# Patient Record
Sex: MALE | Race: WHITE | NOT HISPANIC OR LATINO | Employment: OTHER | URBAN - METROPOLITAN AREA
[De-identification: names, ages, dates, MRNs, and addresses within clinical notes are randomized per-mention and may not be internally consistent; named-entity substitution may affect disease eponyms.]

---

## 2017-07-10 ENCOUNTER — GENERIC CONVERSION - ENCOUNTER (OUTPATIENT)
Dept: OTHER | Facility: OTHER | Age: 77
End: 2017-07-10

## 2018-01-12 NOTE — MISCELLANEOUS
Message  TOVIAZ TOO EXPENSIVE  PER DR JENSEN PT TO TRY OXYBUTYNIN 5MG TID  TO CALL OFFICE WITH STATUS  FLOMAX WAS D/C'D      Active Problems    1  Aortic stenosis (424 1) (I35 0)   2  Benign Prostatic Hypertrophy Without Urinary Obstruction With Other Lower Urinary Tract   Symptoms (600 00)   3  Functional murmur (R01 0)   4  Hypercholesterolemia (272 0) (E78 00)   5  Pre-operative cardiovascular examination (V72 81) (Z01 810)   6  Right bundle branch block (RBBB) (426 4) (I45 10)   7  Right inguinal hernia (550 90) (K40 90)    Current Meds   1  Flomax 0 4 MG Oral Capsule; Therapy: (Recorded:[id number]) to Recorded   2  Lovastatin 20 MG Oral Tablet; Therapy: (Recorded:17Jan2014) to Recorded   3  Oxybutynin Chloride 5 MG Oral Tablet; Therapy: (Recorded:[id number]) to Recorded    Allergies    1   No Known Drug Allergies    Plan  Benign Prostatic Hypertrophy Without Urinary Obstruction With Other Lower Urinary Tract  Symptoms    · Flomax 0 4 MG Oral Capsule (Tamsulosin HCl)    Signatures   Electronically signed by : Melissa Krishnamurthy RN; Jul 10 2017  4:58PM EST                       (Author)

## 2018-02-21 ENCOUNTER — OFFICE VISIT (OUTPATIENT)
Dept: CARDIOLOGY CLINIC | Facility: CLINIC | Age: 78
End: 2018-02-21
Payer: MEDICARE

## 2018-02-21 VITALS
HEIGHT: 71 IN | DIASTOLIC BLOOD PRESSURE: 70 MMHG | RESPIRATION RATE: 16 BRPM | WEIGHT: 197.8 LBS | BODY MASS INDEX: 27.69 KG/M2 | SYSTOLIC BLOOD PRESSURE: 118 MMHG | HEART RATE: 67 BPM

## 2018-02-21 DIAGNOSIS — I45.10 RBBB (RIGHT BUNDLE BRANCH BLOCK): ICD-10-CM

## 2018-02-21 DIAGNOSIS — E78.00 HYPERCHOLESTEREMIA: ICD-10-CM

## 2018-02-21 DIAGNOSIS — I35.0 AORTIC VALVE STENOSIS, ETIOLOGY OF CARDIAC VALVE DISEASE UNSPECIFIED: Primary | ICD-10-CM

## 2018-02-21 DIAGNOSIS — I35.0 NONRHEUMATIC AORTIC VALVE STENOSIS: ICD-10-CM

## 2018-02-21 PROCEDURE — 93000 ELECTROCARDIOGRAM COMPLETE: CPT | Performed by: INTERNAL MEDICINE

## 2018-02-21 PROCEDURE — 99213 OFFICE O/P EST LOW 20 MIN: CPT | Performed by: INTERNAL MEDICINE

## 2018-02-21 RX ORDER — OXYBUTYNIN CHLORIDE 5 MG/1
TABLET ORAL
COMMUNITY
Start: 2017-11-27 | End: 2018-09-26 | Stop reason: ALTCHOICE

## 2018-02-21 RX ORDER — MULTIVITAMIN
1 CAPSULE ORAL DAILY
COMMUNITY

## 2018-02-21 RX ORDER — LOVASTATIN 20 MG/1
20 TABLET ORAL
COMMUNITY
Start: 2018-01-02

## 2018-02-21 RX ORDER — TAMSULOSIN HYDROCHLORIDE 0.4 MG/1
CAPSULE ORAL
COMMUNITY
Start: 2018-01-02 | End: 2018-06-25 | Stop reason: SDUPTHER

## 2018-02-21 NOTE — PROGRESS NOTES
Cardiology Follow Up        Amber Darren      1940      [id number]      Discussion/Summary:    1  Aortic valve stenosis:  Mild by echocardiogram in 2016, sounds moderate by auscultation on today's exam   Will recheck echocardiogram   Patient requested to call 1 day after completion of echocardiogram to discuss results over the phone  If mild-to-moderate, will repeat in 2 years  If moderate, will repeat in 1 year  2   Dyslipidemia:  Continue lovastatin, routine lipid panel per PCP        Interval History: This is a very pleasant 77-year-old male with a history of mild aortic valve stenosis, diagnosed in 2016, at which time he was seen by Dr Shira Aranda of our group  He also has a complete RBBB  He did not follow up after that up until today  He denies any symptoms at rest and with exertion  He denies chest pain, shortness of breath, lightheadedness, dizziness, presyncope, syncope, orthopnea, palpitations, lower extremity edema  He has been told of a heart murmur since childhood  Vitals:  Vitals:    02/21/18 1019   BP: 118/70   BP Location: Left arm   Patient Position: Sitting   Cuff Size: Standard   Pulse: 67   Resp: 16   Weight: 89 7 kg (197 lb 12 8 oz)   Height: 5' 11" (1 803 m)         No past medical history on file  Social History     Social History    Marital status:      Spouse name: N/A    Number of children: N/A    Years of education: N/A     Occupational History    Not on file  Social History Main Topics    Smoking status: Former Smoker    Smokeless tobacco: Never Used    Alcohol use Not on file    Drug use: Unknown    Sexual activity: Not on file     Other Topics Concern    Not on file     Social History Narrative    No narrative on file      No family history on file  No past surgical history on file      Current Outpatient Prescriptions:     lovastatin (MEVACOR) 20 mg tablet, , Disp: , Rfl:     Multiple Vitamin (MULTIVITAMIN) capsule, Take 1 capsule by mouth daily, Disp: , Rfl:     tamsulosin (FLOMAX) 0 4 mg, , Disp: , Rfl:     oxybutynin (DITROPAN) 5 mg tablet, , Disp: , Rfl:     Labs:  No visits with results within 2 Month(s) from this visit  Latest known visit with results is:   No results found for any previous visit  Review of Systems:  Review of Systems   Respiratory: Negative  Cardiovascular: Negative  All other systems reviewed and are negative  Physical Exam:  Physical Exam   Constitutional: He is oriented to person, place, and time  He appears well-developed and well-nourished  No distress  HENT:   Head: Normocephalic and atraumatic  Eyes: EOM are normal  Pupils are equal, round, and reactive to light  Right eye exhibits no discharge  No scleral icterus  Neck: Normal range of motion  Neck supple  No thyromegaly present  Cardiovascular: Normal rate, regular rhythm and normal heart sounds  Exam reveals no gallop and no friction rub  No murmur heard  Pulmonary/Chest: Effort normal and breath sounds normal    Abdominal: He exhibits no distension  There is no tenderness  There is no rebound and no guarding  Musculoskeletal: Normal range of motion  He exhibits no edema  Neurological: He is alert and oriented to person, place, and time  Coordination normal    Skin: Skin is warm and dry  No rash noted  He is not diaphoretic  No erythema  No pallor  Psychiatric: He has a normal mood and affect   His behavior is normal  Judgment and thought content normal

## 2018-03-06 ENCOUNTER — HOSPITAL ENCOUNTER (OUTPATIENT)
Dept: NON INVASIVE DIAGNOSTICS | Facility: CLINIC | Age: 78
Discharge: HOME | End: 2018-03-06
Payer: MEDICARE

## 2018-03-06 DIAGNOSIS — I35.0 AORTIC VALVE STENOSIS, ETIOLOGY OF CARDIAC VALVE DISEASE UNSPECIFIED: ICD-10-CM

## 2018-03-06 PROCEDURE — 93306 TTE W/DOPPLER COMPLETE: CPT | Performed by: INTERNAL MEDICINE

## 2018-03-06 PROCEDURE — 93306 TTE W/DOPPLER COMPLETE: CPT

## 2018-04-26 LAB
ABSOL LYMPHOCYTES (HISTORICAL): 1.2 K/UL (ref 0.5–4)
ALBUMIN SERPL BCP-MCNC: 4 G/DL (ref 3–5.2)
ALP SERPL-CCNC: 63 U/L (ref 43–122)
ALT SERPL W P-5'-P-CCNC: 33 U/L (ref 9–52)
AMORPHOUS MATERIAL (HISTORICAL): ABNORMAL
ANION GAP SERPL CALCULATED.3IONS-SCNC: 9 MMOL/L (ref 5–14)
AST SERPL W P-5'-P-CCNC: 26 U/L (ref 17–59)
BACTERIA UR QL AUTO: ABNORMAL
BASOPHILS # BLD AUTO: 0.1 K/UL (ref 0–0.1)
BASOPHILS # BLD AUTO: 1 % (ref 0–1)
BILIRUB SERPL-MCNC: 0.6 MG/DL
BILIRUB UR QL STRIP: NEGATIVE MG/DL
BUN SERPL-MCNC: 16 MG/DL (ref 5–25)
CALCIUM SERPL-MCNC: 9.7 MG/DL (ref 8.4–10.2)
CASTS/CASTS TYPE (HISTORICAL): ABNORMAL /LPF
CHLORIDE SERPL-SCNC: 107 MEQ/L (ref 97–108)
CHOLEST SERPL-MCNC: 171 MG/DL
CHOLEST/HDLC SERPL: 2.6 {RATIO}
CLARITY UR: CLEAR
CO2 SERPL-SCNC: 29 MMOL/L (ref 22–30)
COLOR UR: YELLOW
CREATINE, SERUM (HISTORICAL): 1.06 MG/DL (ref 0.7–1.5)
CRYSTAL TYPE (HISTORICAL): ABNORMAL /HPF
DEPRECATED RDW RBC AUTO: 13.4 %
EGFR (HISTORICAL): >60 ML/MIN/1.73 M2
EOSINOPHIL # BLD AUTO: 0.2 K/UL (ref 0–0.4)
EOSINOPHIL NFR BLD AUTO: 3 % (ref 0–6)
GLUCOSE FASTING (HISTORICAL): 103 MG/DL (ref 70–99)
GLUCOSE UR STRIP-MCNC: NEGATIVE MG/DL
HCT VFR BLD AUTO: 44 % (ref 41–53)
HDLC SERPL-MCNC: 67 MG/DL
HGB BLD-MCNC: 14.7 G/DL (ref 13.5–17.5)
HGB UR QL STRIP.AUTO: NEGATIVE
KETONES UR STRIP-MCNC: NEGATIVE MG/DL
LDL/HDL RATIO (HISTORICAL): 1.4
LDLC SERPL CALC-MCNC: 91 MG/DL
LEUKOCYTE ESTERASE UR QL STRIP: ABNORMAL
LYMPHOCYTES NFR BLD AUTO: 25 % (ref 25–45)
MCH RBC QN AUTO: 29.9 PG (ref 26–34)
MCHC RBC AUTO-ENTMCNC: 33.4 % (ref 31–36)
MCV RBC AUTO: 89 FL (ref 80–100)
MONOCYTES # BLD AUTO: 0.3 K/UL (ref 0.2–0.9)
MONOCYTES NFR BLD AUTO: 7 % (ref 1–10)
MUCOUS THREADS URNS QL MICRO: ABNORMAL
NEUTROPHILS ABS COUNT (HISTORICAL): 3.2 K/UL (ref 1.8–7.8)
NEUTS SEG NFR BLD AUTO: 64 % (ref 45–65)
NITRITE UR QL STRIP: NEGATIVE
NON-SQ EPI CELLS URNS QL MICRO: ABNORMAL
OTHER STN SPEC: ABNORMAL
PH UR STRIP.AUTO: 6.5 [PH] (ref 4.5–8)
PLATELET # BLD AUTO: 224 K/MCL (ref 150–450)
POTASSIUM SERPL-SCNC: 4.7 MEQ/L (ref 3.6–5)
PROT UR STRIP-MCNC: NEGATIVE MG/DL
PSA (HISTORICAL): 3.28 NG/ML
RBC # BLD AUTO: 4.93 M/MCL (ref 4.5–5.9)
RBC #/AREA URNS AUTO: ABNORMAL /HPF
SODIUM SERPL-SCNC: 145 MEQ/L (ref 137–147)
SP GR UR STRIP.AUTO: 1.02 (ref 1–1.04)
TOTAL PROTEIN (HISTORICAL): 6.4 G/DL (ref 5.9–8.4)
TRIGL SERPL-MCNC: 66 MG/DL
TSH SERPL DL<=0.05 MIU/L-ACNC: 5.4 UIU/ML (ref 0.47–4.68)
UROBILINOGEN UR QL STRIP.AUTO: NEGATIVE MG/DL (ref 0–1)
VLDLC SERPL CALC-MCNC: 13 MG/DL (ref 0–40)
WBC # BLD AUTO: 5 K/MCL (ref 4.5–11)
WBC #/AREA URNS AUTO: >35 /HPF

## 2018-06-25 DIAGNOSIS — N13.9 BENIGN LOCALIZED HYPERPLASIA OF PROSTATE WITH URINARY OBSTRUCTION AND LOWER URINARY TRACT SYMPTOMS: Primary | ICD-10-CM

## 2018-06-25 DIAGNOSIS — N40.1 BENIGN LOCALIZED HYPERPLASIA OF PROSTATE WITH URINARY OBSTRUCTION AND LOWER URINARY TRACT SYMPTOMS: Primary | ICD-10-CM

## 2018-06-25 NOTE — TELEPHONE ENCOUNTER
Patient called requesting refill on Tamsulosin 0 4mg    Request for same, 90 day supply with NO refills was queued and forwarded to Dr Jes Ramírez for approval

## 2018-06-26 RX ORDER — TAMSULOSIN HYDROCHLORIDE 0.4 MG/1
0.4 CAPSULE ORAL
Qty: 90 CAPSULE | Refills: 0 | Status: SHIPPED | OUTPATIENT
Start: 2018-06-26 | End: 2018-06-27 | Stop reason: SDUPTHER

## 2018-06-27 DIAGNOSIS — N13.9 BENIGN LOCALIZED HYPERPLASIA OF PROSTATE WITH URINARY OBSTRUCTION AND LOWER URINARY TRACT SYMPTOMS: ICD-10-CM

## 2018-06-27 DIAGNOSIS — N40.1 BENIGN LOCALIZED HYPERPLASIA OF PROSTATE WITH URINARY OBSTRUCTION AND LOWER URINARY TRACT SYMPTOMS: ICD-10-CM

## 2018-06-27 RX ORDER — TAMSULOSIN HYDROCHLORIDE 0.4 MG/1
CAPSULE ORAL
Qty: 90 CAPSULE | Refills: 3 | Status: SHIPPED | OUTPATIENT
Start: 2018-06-27 | End: 2018-09-26 | Stop reason: SDUPTHER

## 2018-08-23 ENCOUNTER — TRANSCRIBE ORDERS (OUTPATIENT)
Dept: LAB | Facility: CLINIC | Age: 78
End: 2018-08-23

## 2018-08-23 ENCOUNTER — APPOINTMENT (OUTPATIENT)
Dept: LAB | Facility: CLINIC | Age: 78
End: 2018-08-23
Payer: MEDICARE

## 2018-08-23 DIAGNOSIS — E78.00 PURE HYPERCHOLESTEROLEMIA: Primary | ICD-10-CM

## 2018-08-23 DIAGNOSIS — N40.0 ENLARGED PROSTATE: ICD-10-CM

## 2018-08-23 LAB
T4 FREE SERPL-MCNC: 0.8 NG/DL (ref 0.76–1.46)
TSH SERPL DL<=0.05 MIU/L-ACNC: 2.23 UIU/ML (ref 0.36–3.74)

## 2018-08-23 PROCEDURE — 36415 COLL VENOUS BLD VENIPUNCTURE: CPT

## 2018-08-23 PROCEDURE — 84439 ASSAY OF FREE THYROXINE: CPT

## 2018-08-23 PROCEDURE — 84443 ASSAY THYROID STIM HORMONE: CPT

## 2018-09-26 ENCOUNTER — OFFICE VISIT (OUTPATIENT)
Dept: UROLOGY | Facility: MEDICAL CENTER | Age: 78
End: 2018-09-26
Payer: MEDICARE

## 2018-09-26 VITALS
WEIGHT: 198 LBS | DIASTOLIC BLOOD PRESSURE: 80 MMHG | SYSTOLIC BLOOD PRESSURE: 130 MMHG | BODY MASS INDEX: 27.72 KG/M2 | HEIGHT: 71 IN

## 2018-09-26 DIAGNOSIS — N13.9 BENIGN LOCALIZED HYPERPLASIA OF PROSTATE WITH URINARY OBSTRUCTION AND LOWER URINARY TRACT SYMPTOMS: Primary | ICD-10-CM

## 2018-09-26 DIAGNOSIS — N40.1 BENIGN LOCALIZED HYPERPLASIA OF PROSTATE WITH URINARY OBSTRUCTION AND LOWER URINARY TRACT SYMPTOMS: Primary | ICD-10-CM

## 2018-09-26 LAB
SL AMB  POCT GLUCOSE, UA: ABNORMAL
SL AMB LEUKOCYTE ESTERASE,UA: ABNORMAL
SL AMB POCT BILIRUBIN,UA: ABNORMAL
SL AMB POCT BLOOD,UA: ABNORMAL
SL AMB POCT CLARITY,UA: CLEAR
SL AMB POCT COLOR,UA: YELLOW
SL AMB POCT KETONES,UA: ABNORMAL
SL AMB POCT NITRITE,UA: ABNORMAL
SL AMB POCT PH,UA: 7
SL AMB POCT SPECIFIC GRAVITY,UA: 1.02
SL AMB POCT URINE PROTEIN: ABNORMAL
SL AMB POCT UROBILINOGEN: 0.2

## 2018-09-26 PROCEDURE — 81003 URINALYSIS AUTO W/O SCOPE: CPT | Performed by: UROLOGY

## 2018-09-26 PROCEDURE — 99214 OFFICE O/P EST MOD 30 MIN: CPT | Performed by: UROLOGY

## 2018-09-26 RX ORDER — TAMSULOSIN HYDROCHLORIDE 0.4 MG/1
0.4 CAPSULE ORAL DAILY
Qty: 90 CAPSULE | Refills: 3 | Status: SHIPPED | OUTPATIENT
Start: 2018-09-26 | End: 2019-08-08 | Stop reason: SDUPTHER

## 2018-09-26 NOTE — PROGRESS NOTES
IPSS Questionnaire (AUA-7): Over the past month    1)  How often have you had a sensation of not emptying your bladder completely after you finish urinating? 1 - Less than 1 time in 5   2)  How often have you had to urinate again less than two hours after you finished urinating? 1 - Less than 1 time in 5   3)  How often have you found you stopped and started again several times when you urinated? 2 - Less than half the time   4) How difficult have you found it to postpone urination? 1 - Less than 1 time in 5   5) How often have you had a weak urinary stream?  4 - More than half the time   6) How often have you had to push or strain to begin urination? 0 - Not at all   7) How many times did you most typically get up to urinate from the time you went to bed until the time you got up in the morning?   3 - 3 times   Total Score:  12

## 2018-09-26 NOTE — PROGRESS NOTES
Assessment/Plan:   1  BPH with obstructive symptoms-the patient will continue on alpha blockade with tamsulosin being renewed  Anticholinergic medications are discontinued as this has been of no benefit symptomatically  Patient and I did discuss 5 alpha reductase inhibition as well as such modalities as Uro lift and TURP as well as GreenLight laser vaporization of the prostate  Patient may be a good candidate for Uro lift however cystourethroscopy would be helpful evaluating prostate size and configuration  Cystourethroscopy and measurement of PVR will be ordered for next year as well  If the patient wishes to continue with more aggressive approach towards BPH he will contact me  The patient's regular prostate cancer screening with PSA testing is now outside AUA guidelines  We discussed this and inasmuch as the patient's PSA is within normal limits and has been stable I do not feel that continued PSA testing is warranted  Diagnoses and all orders for this visit:    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 urinary tract symptoms  -     POCT urine dip auto non-scope  -     tamsulosin (FLOMAX) 0 4 mg; Take 1 capsule (0 4 mg total) by mouth daily  -     Comprehensive metabolic panel; Future          Subjective:     Patient ID: Larisa Beckman is a 77 y o  male  Chief complaint:  Enlarged prostate    History of present illness 41-year-old gentleman previously followed by Dr Mt Campuzano for BPH with obstructive symptoms  The patient is currently on tamsulosin for alpha blockade and has an AUA symptom score of 12  The patient notes that his stream is for the most part weakened however not a dribble    The patient does have some urgency frequency and nocturia which have not responded anticholinergic medication in the past   The patient does note that tamsulosin initially worked very well to improve his urinary stream and overall voiding pattern however over time its effectiveness has seem to have waned  He denies dysuria gross hematuria incontinence  Review of Systems   Constitutional: Negative  HENT: Negative  Respiratory: Negative  Cardiovascular: Negative  Gastrointestinal: Negative  Genitourinary:        See HPI   Musculoskeletal: Negative  Neurological: Negative  Psychiatric/Behavioral: Negative  Objective:     Physical Exam   Constitutional: He is oriented to person, place, and time  He appears well-nourished  No distress  HENT:   Head: Atraumatic  Eyes: Conjunctivae and EOM are normal    Neck: Neck supple  Pulmonary/Chest: Effort normal  No respiratory distress  Abdominal: Soft  He exhibits no distension  Genitourinary:   Genitourinary Comments: Phallus normal without cutaneous lesions  Testes adnexa palpably normal   Perineum palpably normal   MARCELO reveals a normal anal verge normal anal sphincter tone no palpable rectal masses  The prostate is approximately 35-40 g by digital estimate without nodularity or asymmetry  Neurological: He is alert and oriented to person, place, and time  Psychiatric: He has a normal mood and affect  His behavior is normal  Judgment and thought content normal    Vitals reviewed

## 2018-09-26 NOTE — LETTER
September 26, 2018     Martita aMrk MD  [facility] [address]  [address] [place] [address]     Patient: Sophie Schneider   YOB: 1940   Date of Visit: 9/26/2018       Dear Dr Benjamin Aranda: Thank you for referring Sophie Schneider to me for evaluation  Below are my notes for this consultation  If you have questions, please do not hesitate to call me  I look forward to following your patient along with you  Sincerely,        Nara De La Torre MD        CC: No Recipients  Nara De La Torre MD  9/26/2018  1:51 PM  Sign at close encounter  Assessment/Plan:   1  BPH with obstructive symptoms-the patient will continue on alpha blockade with tamsulosin being renewed  Anticholinergic medications are discontinued as this has been of no benefit symptomatically  Patient and I did discuss 5 alpha reductase inhibition as well as such modalities as Uro lift and TURP as well as GreenLight laser vaporization of the prostate  Patient may be a good candidate for Uro lift however cystourethroscopy would be helpful evaluating prostate size and configuration  Cystourethroscopy and measurement of PVR will be ordered for next year as well  If the patient wishes to continue with more aggressive approach towards BPH he will contact me  The patient's regular prostate cancer screening with PSA testing is now outside AUA guidelines  We discussed this and inasmuch as the patient's PSA is within normal limits and has been stable I do not feel that continued PSA testing is warranted  Diagnoses and all orders for this visit:    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 urinary tract symptoms  -     POCT urine dip auto non-scope  -     tamsulosin (FLOMAX) 0 4 mg; Take 1 capsule (0 4 mg total) by mouth daily  -     Comprehensive metabolic panel; Future          Subjective:     Patient ID: Sophie Schneider is a 77 y o  male      Chief complaint:  Enlarged prostate    History of present illness 65-year-old gentleman previously followed by Dr Tom Hunter for BPH with obstructive symptoms  The patient is currently on tamsulosin for alpha blockade and has an AUA symptom score of 12  The patient notes that his stream is for the most part weakened however not a dribble  The patient does have some urgency frequency and nocturia which have not responded anticholinergic medication in the past   The patient does note that tamsulosin initially worked very well to improve his urinary stream and overall voiding pattern however over time its effectiveness has seem to have waned  He denies dysuria gross hematuria incontinence  Review of Systems   Constitutional: Negative  HENT: Negative  Respiratory: Negative  Cardiovascular: Negative  Gastrointestinal: Negative  Genitourinary:        See HPI   Musculoskeletal: Negative  Neurological: Negative  Psychiatric/Behavioral: Negative  Objective:     Physical Exam   Constitutional: He is oriented to person, place, and time  He appears well-nourished  No distress  HENT:   Head: Atraumatic  Eyes: Conjunctivae and EOM are normal    Neck: Neck supple  Pulmonary/Chest: Effort normal  No respiratory distress  Abdominal: Soft  He exhibits no distension  Genitourinary:   Genitourinary Comments: Phallus normal without cutaneous lesions  Testes adnexa palpably normal   Perineum palpably normal   MARCELO reveals a normal anal verge normal anal sphincter tone no palpable rectal masses  The prostate is approximately 35-40 g by digital estimate without nodularity or asymmetry  Neurological: He is alert and oriented to person, place, and time  Psychiatric: He has a normal mood and affect  His behavior is normal  Judgment and thought content normal    Vitals reviewed

## 2018-11-05 ENCOUNTER — TRANSCRIBE ORDERS (OUTPATIENT)
Dept: ADMINISTRATIVE | Facility: HOSPITAL | Age: 78
End: 2018-11-05

## 2018-11-05 ENCOUNTER — APPOINTMENT (OUTPATIENT)
Dept: LAB | Facility: CLINIC | Age: 78
End: 2018-11-05
Payer: MEDICARE

## 2018-11-05 DIAGNOSIS — E78.00 PURE HYPERCHOLESTEROLEMIA: Primary | ICD-10-CM

## 2018-11-05 DIAGNOSIS — E78.00 PURE HYPERCHOLESTEROLEMIA: ICD-10-CM

## 2018-11-05 LAB
ALBUMIN SERPL BCP-MCNC: 3.8 G/DL (ref 3.5–5)
ALP SERPL-CCNC: 77 U/L (ref 46–116)
ALT SERPL W P-5'-P-CCNC: 34 U/L (ref 12–78)
ANION GAP SERPL CALCULATED.3IONS-SCNC: 5 MMOL/L (ref 4–13)
AST SERPL W P-5'-P-CCNC: 22 U/L (ref 5–45)
BILIRUB SERPL-MCNC: 0.7 MG/DL (ref 0.2–1)
BUN SERPL-MCNC: 16 MG/DL (ref 5–25)
CALCIUM SERPL-MCNC: 9 MG/DL (ref 8.3–10.1)
CHLORIDE SERPL-SCNC: 106 MMOL/L (ref 100–108)
CHOLEST SERPL-MCNC: 175 MG/DL (ref 50–200)
CO2 SERPL-SCNC: 28 MMOL/L (ref 21–32)
CREAT SERPL-MCNC: 1.24 MG/DL (ref 0.6–1.3)
GFR SERPL CREATININE-BSD FRML MDRD: 55 ML/MIN/1.73SQ M
GLUCOSE P FAST SERPL-MCNC: 97 MG/DL (ref 65–99)
HDLC SERPL-MCNC: 77 MG/DL (ref 40–60)
LDLC SERPL CALC-MCNC: 84 MG/DL (ref 0–100)
NONHDLC SERPL-MCNC: 98 MG/DL
POTASSIUM SERPL-SCNC: 5 MMOL/L (ref 3.5–5.3)
PROT SERPL-MCNC: 7.3 G/DL (ref 6.4–8.2)
SODIUM SERPL-SCNC: 139 MMOL/L (ref 136–145)
T4 FREE SERPL-MCNC: 0.93 NG/DL (ref 0.76–1.46)
TRIGL SERPL-MCNC: 69 MG/DL
TSH SERPL DL<=0.05 MIU/L-ACNC: 3.82 UIU/ML (ref 0.36–3.74)

## 2018-11-05 PROCEDURE — 36415 COLL VENOUS BLD VENIPUNCTURE: CPT

## 2018-11-05 PROCEDURE — 84439 ASSAY OF FREE THYROXINE: CPT

## 2018-11-05 PROCEDURE — 80053 COMPREHEN METABOLIC PANEL: CPT

## 2018-11-05 PROCEDURE — 84443 ASSAY THYROID STIM HORMONE: CPT

## 2018-11-05 PROCEDURE — 80061 LIPID PANEL: CPT

## 2019-02-07 ENCOUNTER — APPOINTMENT (OUTPATIENT)
Dept: LAB | Facility: CLINIC | Age: 79
End: 2019-02-07
Payer: COMMERCIAL

## 2019-02-07 ENCOUNTER — TRANSCRIBE ORDERS (OUTPATIENT)
Dept: ADMINISTRATIVE | Facility: HOSPITAL | Age: 79
End: 2019-02-07

## 2019-02-07 DIAGNOSIS — E03.9 MYXEDEMA HEART DISEASE: Primary | ICD-10-CM

## 2019-02-07 DIAGNOSIS — D64.9 ANEMIA, UNSPECIFIED TYPE: ICD-10-CM

## 2019-02-07 DIAGNOSIS — I10 ESSENTIAL HYPERTENSION, MALIGNANT: ICD-10-CM

## 2019-02-07 DIAGNOSIS — I51.9 MYXEDEMA HEART DISEASE: ICD-10-CM

## 2019-02-07 DIAGNOSIS — I51.9 MYXEDEMA HEART DISEASE: Primary | ICD-10-CM

## 2019-02-07 DIAGNOSIS — E03.9 MYXEDEMA HEART DISEASE: ICD-10-CM

## 2019-02-07 LAB
T4 FREE SERPL-MCNC: 0.82 NG/DL (ref 0.76–1.46)
TSH SERPL DL<=0.05 MIU/L-ACNC: 5.22 UIU/ML (ref 0.36–3.74)

## 2019-02-07 PROCEDURE — 84443 ASSAY THYROID STIM HORMONE: CPT

## 2019-02-07 PROCEDURE — 84439 ASSAY OF FREE THYROXINE: CPT

## 2019-02-07 PROCEDURE — 36415 COLL VENOUS BLD VENIPUNCTURE: CPT

## 2019-04-15 ENCOUNTER — APPOINTMENT (OUTPATIENT)
Dept: LAB | Facility: CLINIC | Age: 79
End: 2019-04-15
Payer: COMMERCIAL

## 2019-04-15 ENCOUNTER — TRANSCRIBE ORDERS (OUTPATIENT)
Dept: ADMINISTRATIVE | Facility: HOSPITAL | Age: 79
End: 2019-04-15

## 2019-04-15 DIAGNOSIS — I10 ESSENTIAL HYPERTENSION, MALIGNANT: ICD-10-CM

## 2019-04-15 DIAGNOSIS — E03.9 MYXEDEMA HEART DISEASE: Primary | ICD-10-CM

## 2019-04-15 DIAGNOSIS — E03.9 MYXEDEMA HEART DISEASE: ICD-10-CM

## 2019-04-15 DIAGNOSIS — I51.9 MYXEDEMA HEART DISEASE: Primary | ICD-10-CM

## 2019-04-15 DIAGNOSIS — D64.9 ANEMIA, UNSPECIFIED TYPE: ICD-10-CM

## 2019-04-15 DIAGNOSIS — I51.9 MYXEDEMA HEART DISEASE: ICD-10-CM

## 2019-04-15 LAB
ALBUMIN SERPL BCP-MCNC: 3.6 G/DL (ref 3.5–5)
ALP SERPL-CCNC: 69 U/L (ref 46–116)
ALT SERPL W P-5'-P-CCNC: 29 U/L (ref 12–78)
ANION GAP SERPL CALCULATED.3IONS-SCNC: 4 MMOL/L (ref 4–13)
AST SERPL W P-5'-P-CCNC: 17 U/L (ref 5–45)
BACTERIA UR QL AUTO: ABNORMAL /HPF
BASOPHILS # BLD AUTO: 0.07 THOUSANDS/ΜL (ref 0–0.1)
BASOPHILS NFR BLD AUTO: 1 % (ref 0–1)
BILIRUB SERPL-MCNC: 0.63 MG/DL (ref 0.2–1)
BILIRUB UR QL STRIP: NEGATIVE
BUN SERPL-MCNC: 15 MG/DL (ref 5–25)
CALCIUM SERPL-MCNC: 8.7 MG/DL (ref 8.3–10.1)
CHLORIDE SERPL-SCNC: 113 MMOL/L (ref 100–108)
CHOLEST SERPL-MCNC: 164 MG/DL (ref 50–200)
CLARITY UR: CLEAR
CO2 SERPL-SCNC: 28 MMOL/L (ref 21–32)
COLOR UR: YELLOW
CREAT SERPL-MCNC: 1.09 MG/DL (ref 0.6–1.3)
EOSINOPHIL # BLD AUTO: 0.19 THOUSAND/ΜL (ref 0–0.61)
EOSINOPHIL NFR BLD AUTO: 4 % (ref 0–6)
ERYTHROCYTE [DISTWIDTH] IN BLOOD BY AUTOMATED COUNT: 13.2 % (ref 11.6–15.1)
EST. AVERAGE GLUCOSE BLD GHB EST-MCNC: 117 MG/DL
GFR SERPL CREATININE-BSD FRML MDRD: 64 ML/MIN/1.73SQ M
GLUCOSE P FAST SERPL-MCNC: 99 MG/DL (ref 65–99)
GLUCOSE UR STRIP-MCNC: NEGATIVE MG/DL
HBA1C MFR BLD: 5.7 % (ref 4.2–6.3)
HCT VFR BLD AUTO: 43.7 % (ref 36.5–49.3)
HDLC SERPL-MCNC: 69 MG/DL (ref 40–60)
HGB BLD-MCNC: 14.3 G/DL (ref 12–17)
HGB UR QL STRIP.AUTO: NEGATIVE
HYALINE CASTS #/AREA URNS LPF: ABNORMAL /LPF
IMM GRANULOCYTES # BLD AUTO: 0.02 THOUSAND/UL (ref 0–0.2)
IMM GRANULOCYTES NFR BLD AUTO: 0 % (ref 0–2)
KETONES UR STRIP-MCNC: NEGATIVE MG/DL
LDLC SERPL CALC-MCNC: 80 MG/DL (ref 0–100)
LEUKOCYTE ESTERASE UR QL STRIP: ABNORMAL
LYMPHOCYTES # BLD AUTO: 0.96 THOUSANDS/ΜL (ref 0.6–4.47)
LYMPHOCYTES NFR BLD AUTO: 20 % (ref 14–44)
MCH RBC QN AUTO: 29.9 PG (ref 26.8–34.3)
MCHC RBC AUTO-ENTMCNC: 32.7 G/DL (ref 31.4–37.4)
MCV RBC AUTO: 91 FL (ref 82–98)
MONOCYTES # BLD AUTO: 0.36 THOUSAND/ΜL (ref 0.17–1.22)
MONOCYTES NFR BLD AUTO: 7 % (ref 4–12)
NEUTROPHILS # BLD AUTO: 3.32 THOUSANDS/ΜL (ref 1.85–7.62)
NEUTS SEG NFR BLD AUTO: 68 % (ref 43–75)
NITRITE UR QL STRIP: NEGATIVE
NON-SQ EPI CELLS URNS QL MICRO: ABNORMAL /HPF
NONHDLC SERPL-MCNC: 95 MG/DL
NRBC BLD AUTO-RTO: 0 /100 WBCS
PH UR STRIP.AUTO: 6 [PH]
PLATELET # BLD AUTO: 219 THOUSANDS/UL (ref 149–390)
PMV BLD AUTO: 10 FL (ref 8.9–12.7)
POTASSIUM SERPL-SCNC: 4.5 MMOL/L (ref 3.5–5.3)
PROT SERPL-MCNC: 6.4 G/DL (ref 6.4–8.2)
PROT UR STRIP-MCNC: NEGATIVE MG/DL
RBC # BLD AUTO: 4.78 MILLION/UL (ref 3.88–5.62)
RBC #/AREA URNS AUTO: ABNORMAL /HPF
SODIUM SERPL-SCNC: 145 MMOL/L (ref 136–145)
SP GR UR STRIP.AUTO: 1.02 (ref 1–1.03)
T4 FREE SERPL-MCNC: 0.89 NG/DL (ref 0.76–1.46)
TRIGL SERPL-MCNC: 73 MG/DL
TSH SERPL DL<=0.05 MIU/L-ACNC: 4.47 UIU/ML (ref 0.36–3.74)
UROBILINOGEN UR QL STRIP.AUTO: 0.2 E.U./DL
WBC # BLD AUTO: 4.92 THOUSAND/UL (ref 4.31–10.16)
WBC #/AREA URNS AUTO: ABNORMAL /HPF

## 2019-04-15 PROCEDURE — 85025 COMPLETE CBC W/AUTO DIFF WBC: CPT

## 2019-04-15 PROCEDURE — 36415 COLL VENOUS BLD VENIPUNCTURE: CPT

## 2019-04-15 PROCEDURE — 81001 URINALYSIS AUTO W/SCOPE: CPT | Performed by: FAMILY MEDICINE

## 2019-04-15 PROCEDURE — 80053 COMPREHEN METABOLIC PANEL: CPT

## 2019-04-15 PROCEDURE — 80061 LIPID PANEL: CPT

## 2019-04-15 PROCEDURE — 84443 ASSAY THYROID STIM HORMONE: CPT

## 2019-04-15 PROCEDURE — 84439 ASSAY OF FREE THYROXINE: CPT

## 2019-04-15 PROCEDURE — 83036 HEMOGLOBIN GLYCOSYLATED A1C: CPT

## 2019-07-19 ENCOUNTER — TRANSCRIBE ORDERS (OUTPATIENT)
Dept: ADMINISTRATIVE | Facility: HOSPITAL | Age: 79
End: 2019-07-19

## 2019-07-19 ENCOUNTER — APPOINTMENT (OUTPATIENT)
Dept: LAB | Facility: CLINIC | Age: 79
End: 2019-07-19
Payer: COMMERCIAL

## 2019-07-19 DIAGNOSIS — I51.9 MYXEDEMA HEART DISEASE: Primary | ICD-10-CM

## 2019-07-19 DIAGNOSIS — I51.9 MYXEDEMA HEART DISEASE: ICD-10-CM

## 2019-07-19 DIAGNOSIS — E03.9 MYXEDEMA HEART DISEASE: ICD-10-CM

## 2019-07-19 DIAGNOSIS — E03.9 MYXEDEMA HEART DISEASE: Primary | ICD-10-CM

## 2019-07-19 LAB
T4 FREE SERPL-MCNC: 0.88 NG/DL (ref 0.76–1.46)
TSH SERPL DL<=0.05 MIU/L-ACNC: 2.83 UIU/ML (ref 0.36–3.74)

## 2019-07-19 PROCEDURE — 84439 ASSAY OF FREE THYROXINE: CPT

## 2019-07-19 PROCEDURE — 84443 ASSAY THYROID STIM HORMONE: CPT

## 2019-07-19 PROCEDURE — 36415 COLL VENOUS BLD VENIPUNCTURE: CPT

## 2019-07-25 ENCOUNTER — TELEPHONE (OUTPATIENT)
Dept: UROLOGY | Facility: MEDICAL CENTER | Age: 79
End: 2019-07-25

## 2019-07-25 NOTE — TELEPHONE ENCOUNTER
Patient of Dr Eliazar Mcguire seen in the [facility] office  Patient advised he is considering a urolift  Please advise

## 2019-08-08 ENCOUNTER — PROCEDURE VISIT (OUTPATIENT)
Dept: UROLOGY | Facility: MEDICAL CENTER | Age: 79
End: 2019-08-08
Payer: COMMERCIAL

## 2019-08-08 VITALS
SYSTOLIC BLOOD PRESSURE: 118 MMHG | BODY MASS INDEX: 26.18 KG/M2 | HEIGHT: 71 IN | DIASTOLIC BLOOD PRESSURE: 76 MMHG | WEIGHT: 187 LBS

## 2019-08-08 DIAGNOSIS — N40.1 BENIGN LOCALIZED HYPERPLASIA OF PROSTATE WITH URINARY OBSTRUCTION AND LOWER URINARY TRACT SYMPTOMS: Primary | ICD-10-CM

## 2019-08-08 DIAGNOSIS — N13.9 BENIGN LOCALIZED HYPERPLASIA OF PROSTATE WITH URINARY OBSTRUCTION AND LOWER URINARY TRACT SYMPTOMS: Primary | ICD-10-CM

## 2019-08-08 LAB
POST-VOID RESIDUAL VOLUME, ML POC: 36 ML
POST-VOID RESIDUAL VOLUME, ML POC: 46 ML

## 2019-08-08 PROCEDURE — 52000 CYSTOURETHROSCOPY: CPT | Performed by: UROLOGY

## 2019-08-08 PROCEDURE — 76872 US TRANSRECTAL: CPT | Performed by: UROLOGY

## 2019-08-08 PROCEDURE — 51741 ELECTRO-UROFLOWMETRY FIRST: CPT | Performed by: UROLOGY

## 2019-08-08 PROCEDURE — 51798 US URINE CAPACITY MEASURE: CPT | Performed by: UROLOGY

## 2019-08-08 PROCEDURE — 99214 OFFICE O/P EST MOD 30 MIN: CPT | Performed by: UROLOGY

## 2019-08-08 RX ORDER — SULFAMETHOXAZOLE AND TRIMETHOPRIM 800; 160 MG/1; MG/1
1 TABLET ORAL EVERY 12 HOURS SCHEDULED
Qty: 4 TABLET | Refills: 0 | Status: SHIPPED | OUTPATIENT
Start: 2019-08-08 | End: 2019-08-10

## 2019-08-08 RX ORDER — TAMSULOSIN HYDROCHLORIDE 0.4 MG/1
0.4 CAPSULE ORAL DAILY
Qty: 90 CAPSULE | Refills: 3 | Status: SHIPPED | OUTPATIENT
Start: 2019-08-08 | End: 2019-10-15 | Stop reason: ALTCHOICE

## 2019-08-08 RX ORDER — LATANOPROST 50 UG/ML
1 SOLUTION/ DROPS OPHTHALMIC
Refills: 3 | COMMUNITY
Start: 2019-05-28

## 2019-08-08 NOTE — LETTER
August 8, 2019     Sukhwinder Pillai MD  [address]  [address]  [facility] [address]    Patient: Yaa Rhoades   YOB: 1940   Date of Visit: 8/8/2019       Dear Dr Jonathan Albright: Thank you for referring Yaa Rhoades to me for evaluation  Below are my notes for this consultation  If you have questions, please do not hesitate to call me  I look forward to following your patient along with you  Sincerely,        Myrtis Shone, MD        CC: No Recipients  Myrtis Shone, MD  8/8/2019  2:02 PM  Sign at close encounter  Cystoscopy  Date/Time: 8/8/2019 2:01 PM  Performed by: Myrtis Shone, MD  Authorized by: Myrtis Shone, MD     Procedure details: cystoscopy    Patient tolerance: Patient tolerated the procedure well with no immediate complications    Additional Procedure Details: Patient was placed supine on the examining table and after prepping the urethral meatus with Betadine 2% lidocaine was inserted per urethra and left indwelling for 5 minutes  Flexible video cystourethroscopy took place revealing a normal anterior urethra  The prostatic urethra revealed bilobar enlargement of the lateral lobes of the prostate with visual occlusion of the bladder outlet  2-4 Uro lift implants would be appropriate for this patient  Urinary bladder was free of any intrinsic lesions or extrinsic mass compression  It was however moderately trabeculated with posterior wall cellule formation  Ureteral orifices were normal position and configuration bilaterally with clear efflux bilaterally  The cystoscope was removed and then the patient was turned in the right flank up position and ultrasound probe passed into the rectum  Transrectal ultrasonography revealed no evidence of hypoechoic peripheral zone lesions  The seminal vesicles appeared to be normal bilaterally with periurethral calcifications noted on ultrasound    Total prostate size was approximately 55 6 cc with a transverse diameter of 55 mm  Patient tolerated both transrectal ultrasound and cystoscopy quite well  There were no complications of these procedures  Neto Salgado MD  8/8/2019  2:04 PM  Sign at close encounter  H&P Exam - Urology   George Wood [de-identified] y o  male MRN: [id number]  Unit/Bed#:  Encounter: [id number]    Assessment/Plan     Assessment:  BPH with lower urinary tract symptoms  Plan:  Cystoscopy insertion of Uro lift implants    History of Present Illness   HPI:  George Wood is a [de-identified] y o  male who presents with a longstanding history of BPH with lower urinary tract symptoms  The patient notes weakening of the urinary stream urinary frequency and urgency  He has been treated previously on tamsulosin but notes that his symptoms are interfering with his lifestyle and he is interested in proceeding with Uro lift  We had previously discussed TURP laser TURP and Uro lift with the patient made aware the possibility of bleeding infection urinary retention increased urgency and frequency or even potential urge incontinence associated with Uro lift  The patient understands that he may require a [place] catheter after Uro lift but this is not the usual procedure  The patient underwent cystourethroscopy in the office on 08/08/2019 at which time bladder trabeculation and cellule formation was identified along with bilobar enlargement of the lateral lobes of the prostate with visual occlusion of the bladder outlet  For the grafts were taken but did not print  The patient would require an estimated 2-4 Uro lift implants to achieve separation of the lateral lobes  Prostate ultrasound was also performed which revealed no evidence of peripheral zone hypoechoic lesions  Periurethral calcifications were identified  Prostate size was estimated ultrasonographically at 55 6 cc with a transverse diameter of 55 2 mm    Urinary flow rate determination revealed a maximal flow of 2 2 cc/second and an average flow of 2 9 cc/second  The patient however only was able to void 17 9 cc/second even though his bladder was filled from cystourethroscopy  Isela Lightning PVR was 46 cc prior to instrumentation  AUA symptom score was 25 with weakened urinary stream 5/5 intermittency 5/5 urinary frequency 4/5 in urgency as well as feelings of incomplete bladder emptying 3 out a 5 with nocturia 3 times per night  The patient now presents for cystoscopy and Uro lift    Review of Systems   Genitourinary:        See HPI   All other systems reviewed and are negative  Historical Information   Past Medical History:   Diagnosis Date    BPH with obstruction/lower urinary tract symptoms     Frequency of urination     Hyperlipidemia     Nocturia      Past Surgical History:   Procedure Laterality Date    INGUINAL HERNIA REPAIR      LUNG BIOPSY       Social History   Social History     Substance and Sexual Activity   Alcohol Use Yes     Social History     Substance and Sexual Activity   Drug Use Not on file     Social History     Tobacco Use   Smoking Status Former Smoker    Last attempt to quit: 2011    Years since quittin 3   Smokeless Tobacco Never Used     Family History:   Family History   Problem Relation Age of Onset    Heart attack Mother     Brain cancer Father        Meds/Allergies   all medications and allergies reviewed  No Known Allergies    Objective   Vitals: Blood pressure 118/76, height 5' 10 5" (1 791 m), weight 84 8 kg (187 lb)  [unfilled]    Invasive Devices     None                 Physical Exam   Constitutional: He is oriented to person, place, and time  He appears well-developed and well-nourished  No distress  HENT:   Head: Normocephalic and atraumatic  Eyes: Pupils are equal, round, and reactive to light  EOM are normal    Neck: Normal range of motion  Neck supple  Cardiovascular: Normal rate, regular rhythm, normal heart sounds and intact distal pulses  Exam reveals no gallop and no friction rub     No murmur heard  Pulmonary/Chest: Effort normal and breath sounds normal  No stridor  No respiratory distress  He has no wheezes  He has no rales  He exhibits no tenderness  Abdominal: Soft  Bowel sounds are normal  He exhibits no distension and no mass  There is no tenderness  There is no rebound and no guarding  No hernia  Genitourinary:   Genitourinary Comments: Phallus normal circumcised without cutaneous lesions  Meatus patent normally placed  Scrotum normal with normal scrotal contents no masses no adnexal abnormality  MARCELO 35-40 g a nodular nontender prostate   Neurological: He is alert and oriented to person, place, and time  Skin: He is not diaphoretic  Psychiatric: He has a normal mood and affect  His behavior is normal  Judgment and thought content normal    Vitals reviewed  Lab Results: I have personally reviewed pertinent reports  Imaging: I have personally reviewed pertinent reports  EKG, Pathology, and Other Studies: I have personally reviewed pertinent reports  VTE Prophylaxis: Sequential compression device Dorisajovany Scripture)     Code Status: [unfilled]  Advance Directive and Living Will:      Power of :    POLST:      Counseling / Coordination of Care  Total floor / unit time spent today 45 minutes  Greater than 50% of total time was spent with the patient and / or family counseling and / or coordination of care  A description of the counseling / coordination of care: Aramis Rowe Uroflow  Date/Time: 8/8/2019 2:04 PM  Performed by: Keturah Patiño MD  Authorized by: Keturah Patiño MD   Procedure - Urodynamics:  Procedure details: uroflow          Post-procedure:     Patient tolerance: Patient tolerated procedure well with no immediate complications      Comments: The patient only voided 17 9 cc with a maximum rate of 2 2 and average treated 2 9 cc/second  The small amount voided makes the urinary flow rate uninterpretable

## 2019-08-08 NOTE — PROGRESS NOTES
Cystoscopy  Date/Time: 8/8/2019 2:01 PM  Performed by: Meliton Brooke MD  Authorized by: Meliton Brooke MD     Procedure details: cystoscopy    Patient tolerance: Patient tolerated the procedure well with no immediate complications    Additional Procedure Details: Patient was placed supine on the examining table and after prepping the urethral meatus with Betadine 2% lidocaine was inserted per urethra and left indwelling for 5 minutes  Flexible video cystourethroscopy took place revealing a normal anterior urethra  The prostatic urethra revealed bilobar enlargement of the lateral lobes of the prostate with visual occlusion of the bladder outlet  2-4 Uro lift implants would be appropriate for this patient  Urinary bladder was free of any intrinsic lesions or extrinsic mass compression  It was however moderately trabeculated with posterior wall cellule formation  Ureteral orifices were normal position and configuration bilaterally with clear efflux bilaterally  The cystoscope was removed and then the patient was turned in the right flank up position and ultrasound probe passed into the rectum  Transrectal ultrasonography revealed no evidence of hypoechoic peripheral zone lesions  The seminal vesicles appeared to be normal bilaterally with periurethral calcifications noted on ultrasound  Total prostate size was approximately 55 6 cc with a transverse diameter of 55 mm  Patient tolerated both transrectal ultrasound and cystoscopy quite well  There were no complications of these procedures

## 2019-08-08 NOTE — H&P
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 [N40 1, N13 9]          Sign at close encounter        Expand All Collapse All      Show:Clear all  [x]Manual[x]Template[]Copied    Added by:  [x]Miguel Worthy MD    []Shelly for details  H&P Exam - Urology   Najma Mg [de-identified] y o  male MRN: [id number]  Unit/Bed#:  Encounter: [id number]     Assessment/Plan      Assessment:  BPH with lower urinary tract symptoms  Plan:  Cystoscopy insertion of Uro lift implants     History of Present Illness         HPI:  Najma Mg is a [de-identified] y o  male who presents with a longstanding history of BPH with lower urinary tract symptoms  The patient notes weakening of the urinary stream urinary frequency and urgency  He has been treated previously on tamsulosin but notes that his symptoms are interfering with his lifestyle and he is interested in proceeding with Uro lift  We had previously discussed TURP laser TURP and Uro lift with the patient made aware the possibility of bleeding infection urinary retention increased urgency and frequency or even potential urge incontinence associated with Uro lift  The patient understands that he may require a Escobedo catheter after Uro lift but this is not the usual procedure      The patient underwent cystourethroscopy in the office on 08/08/2019 at which time bladder trabeculation and cellule formation was identified along with bilobar enlargement of the lateral lobes of the prostate with visual occlusion of the bladder outlet  For the grafts were taken but did not print  The patient would require an estimated 2-4 Uro lift implants to achieve separation of the lateral lobes    Prostate ultrasound was also performed which revealed no evidence of peripheral zone hypoechoic lesions  Periurethral calcifications were identified  Prostate size was estimated ultrasonographically at 55 6 cc with a transverse diameter of 55 2 mm  Urinary flow rate determination revealed a maximal flow of 2 2 cc/second and an average flow of 2 9 cc/second  The patient however only was able to void 17 9 cc/second even though his bladder was filled from cystourethroscopy  Betty Peguero PVR was 46 cc prior to instrumentation  AUA symptom score was 25 with weakened urinary stream 5/5 intermittency 5/5 urinary frequency 4/5 in urgency as well as feelings of incomplete bladder emptying 3 out a 5 with nocturia 3 times per night  The patient now presents for cystoscopy and Uro lift     Review of Systems   Genitourinary:        See HPI   All other systems reviewed and are negative         Historical Information         Medical History        Past Medical History:   Diagnosis Date    BPH with obstruction/lower urinary tract symptoms      Frequency of urination      Hyperlipidemia      Nocturia           Surgical History         Past Surgical History:   Procedure Laterality Date    INGUINAL HERNIA REPAIR        LUNG BIOPSY             Social History         Social History          Substance and Sexual Activity   Alcohol Use Yes      Social History          Substance and Sexual Activity   Drug Use Not on file      Social History           Tobacco Use   Smoking Status Former Smoker    Last attempt to quit: 2011    Years since quittin 3   Smokeless Tobacco Never Used      Family History:   Family History   Problem Relation Age of Onset    Heart attack Mother      Brain cancer Father           Meds/Allergies   all medications and allergies reviewed  No Known Allergies     Objective   Vitals: Blood pressure 118/76, height 5' 10 5" (1 791 m), weight 84 8 kg (187 lb)       [unfilled]         Invasive Devices      None                    Physical Exam   Constitutional: He is oriented to person, place, and time  He appears well-developed and well-nourished  No distress  HENT:   Head: Normocephalic and atraumatic  Eyes: Pupils are equal, round, and reactive to light  EOM are normal    Neck: Normal range of motion  Neck supple  Cardiovascular: Normal rate, regular rhythm, normal heart sounds and intact distal pulses  Exam reveals no gallop and no friction rub  No murmur heard  Pulmonary/Chest: Effort normal and breath sounds normal  No stridor  No respiratory distress  He has no wheezes  He has no rales  He exhibits no tenderness  Abdominal: Soft  Bowel sounds are normal  He exhibits no distension and no mass  There is no tenderness  There is no rebound and no guarding  No hernia  Genitourinary:   Genitourinary Comments: Phallus normal circumcised without cutaneous lesions  Meatus patent normally placed  Scrotum normal with normal scrotal contents no masses no adnexal abnormality  MARCELO 35-40 g a nodular nontender prostate   Neurological: He is alert and oriented to person, place, and time  Skin: He is not diaphoretic  Psychiatric: He has a normal mood and affect  His behavior is normal  Judgment and thought content normal    Vitals reviewed         Lab Results: I have personally reviewed pertinent reports  Imaging: I have personally reviewed pertinent reports  EKG, Pathology, and Other Studies: I have personally reviewed pertinent reports  VTE Prophylaxis: Sequential compression device Jim Calixto)      Code Status: [unfilled]  Advance Directive and Living Will:      Power of :    POLST:       Counseling / Coordination of Care  Total floor / unit time spent today 45 minutes  Greater than 50% of total time was spent with the patient and / or family counseling and / or coordination of care  A description of the counseling / coordination of care:           Uroflow  Date/Time: 8/8/2019 2:04 PM  Performed by: Taylor Vang MD  Authorized by: Taylor Vang MD   Procedure - Urodynamics:  Procedure details: uroflow             Post-procedure:     Patient tolerance: Patient tolerated procedure well with no immediate complications       Comments: The patient only voided 17 9 cc with a maximum rate of 2 2 and average treated 2 9 cc/second    The small amount voided makes the urinary flow rate uninterpretable

## 2019-08-08 NOTE — H&P (VIEW-ONLY)
H&P Exam - Urology   Sukhjinder Sarmiento [de-identified] y o  male MRN: [id number]  Unit/Bed#:  Encounter: [id number]    Assessment/Plan     Assessment:  BPH with lower urinary tract symptoms  Plan:  Cystoscopy insertion of Uro lift implants    History of Present Illness   HPI:  Sukhjinder Sarmiento is a [de-identified] y o  male who presents with a longstanding history of BPH with lower urinary tract symptoms  The patient notes weakening of the urinary stream urinary frequency and urgency  He has been treated previously on tamsulosin but notes that his symptoms are interfering with his lifestyle and he is interested in proceeding with Uro lift  We had previously discussed TURP laser TURP and Uro lift with the patient made aware the possibility of bleeding infection urinary retention increased urgency and frequency or even potential urge incontinence associated with Uro lift  The patient understands that he may require a Escobedo catheter after Uro lift but this is not the usual procedure  The patient underwent cystourethroscopy in the office on 08/08/2019 at which time bladder trabeculation and cellule formation was identified along with bilobar enlargement of the lateral lobes of the prostate with visual occlusion of the bladder outlet  For the grafts were taken but did not print  The patient would require an estimated 2-4 Uro lift implants to achieve separation of the lateral lobes  Prostate ultrasound was also performed which revealed no evidence of peripheral zone hypoechoic lesions  Periurethral calcifications were identified  Prostate size was estimated ultrasonographically at 55 6 cc with a transverse diameter of 55 2 mm  Urinary flow rate determination revealed a maximal flow of 2 2 cc/second and an average flow of 2 9 cc/second  The patient however only was able to void 17 9 cc/second even though his bladder was filled from cystourethroscopy  [facility] PVR was 46 cc prior to instrumentation    AUA symptom score was 25 with weakened urinary stream 5/5 intermittency 5/5 urinary frequency 4/5 in urgency as well as feelings of incomplete bladder emptying 3 out a 5 with nocturia 3 times per night  The patient now presents for cystoscopy and Uro lift    Review of Systems   Genitourinary:        See HPI   All other systems reviewed and are negative  Historical Information   Past Medical History:   Diagnosis Date    BPH with obstruction/lower urinary tract symptoms     Frequency of urination     Hyperlipidemia     Nocturia      Past Surgical History:   Procedure Laterality Date    INGUINAL HERNIA REPAIR      LUNG BIOPSY       Social History   Social History     Substance and Sexual Activity   Alcohol Use Yes     Social History     Substance and Sexual Activity   Drug Use Not on file     Social History     Tobacco Use   Smoking Status Former Smoker    Last attempt to quit: 2011    Years since quittin 3   Smokeless Tobacco Never Used     Family History:   Family History   Problem Relation Age of Onset    Heart attack Mother     Brain cancer Father        Meds/Allergies   all medications and allergies reviewed  No Known Allergies    Objective   Vitals: Blood pressure 118/76, height 5' 10 5" (1 791 m), weight 84 8 kg (187 lb)  [unfilled]    Invasive Devices     None                 Physical Exam   Constitutional: He is oriented to person, place, and time  He appears well-developed and well-nourished  No distress  HENT:   Head: Normocephalic and atraumatic  Eyes: Pupils are equal, round, and reactive to light  EOM are normal    Neck: Normal range of motion  Neck supple  Cardiovascular: Normal rate, regular rhythm, normal heart sounds and intact distal pulses  Exam reveals no gallop and no friction rub  No murmur heard  Pulmonary/Chest: Effort normal and breath sounds normal  No stridor  No respiratory distress  He has no wheezes  He has no rales  He exhibits no tenderness  Abdominal: Soft   Bowel sounds are normal  He exhibits no distension and no mass  There is no tenderness  There is no rebound and no guarding  No hernia  Genitourinary:   Genitourinary Comments: Phallus normal circumcised without cutaneous lesions  Meatus patent normally placed  Scrotum normal with normal scrotal contents no masses no adnexal abnormality  MARCELO 35-40 g a nodular nontender prostate   Neurological: He is alert and oriented to person, place, and time  Skin: He is not diaphoretic  Psychiatric: He has a normal mood and affect  His behavior is normal  Judgment and thought content normal    Vitals reviewed  Lab Results: I have personally reviewed pertinent reports  Imaging: I have personally reviewed pertinent reports  EKG, Pathology, and Other Studies: I have personally reviewed pertinent reports  VTE Prophylaxis: Sequential compression device Barry Pao)     Code Status: [unfilled]  Advance Directive and Living Will:      Power of :    POLST:      Counseling / Coordination of Care  Total floor / unit time spent today 45 minutes  Greater than 50% of total time was spent with the patient and / or family counseling and / or coordination of care  A description of the counseling / coordination of care: Sirisha Coates Uroflow  Date/Time: 8/8/2019 2:04 PM  Performed by: Hernán Brambila MD  Authorized by: Hernán Brambila MD   Procedure - Urodynamics:  Procedure details: uroflow          Post-procedure:     Patient tolerance: Patient tolerated procedure well with no immediate complications      Comments: The patient only voided 17 9 cc with a maximum rate of 2 2 and average treated 2 9 cc/second  The small amount voided makes the urinary flow rate uninterpretable

## 2019-08-08 NOTE — PROGRESS NOTES
H&P Exam - Urology   Larisa Beckman [de-identified] y o  male MRN: [id number]  Unit/Bed#:  Encounter: [id number]    Assessment/Plan     Assessment:  BPH with lower urinary tract symptoms  Plan:  Cystoscopy insertion of Uro lift implants    History of Present Illness   HPI:  Larisa Beckman is a [de-identified] y o  male who presents with a longstanding history of BPH with lower urinary tract symptoms  The patient notes weakening of the urinary stream urinary frequency and urgency  He has been treated previously on tamsulosin but notes that his symptoms are interfering with his lifestyle and he is interested in proceeding with Uro lift  We had previously discussed TURP laser TURP and Uro lift with the patient made aware the possibility of bleeding infection urinary retention increased urgency and frequency or even potential urge incontinence associated with Uro lift  The patient understands that he may require a Escobedo catheter after Uro lift but this is not the usual procedure  The patient underwent cystourethroscopy in the office on 08/08/2019 at which time bladder trabeculation and cellule formation was identified along with bilobar enlargement of the lateral lobes of the prostate with visual occlusion of the bladder outlet  For the grafts were taken but did not print  The patient would require an estimated 2-4 Uro lift implants to achieve separation of the lateral lobes  Prostate ultrasound was also performed which revealed no evidence of peripheral zone hypoechoic lesions  Periurethral calcifications were identified  Prostate size was estimated ultrasonographically at 55 6 cc with a transverse diameter of 55 2 mm  Urinary flow rate determination revealed a maximal flow of 2 2 cc/second and an average flow of 2 9 cc/second  The patient however only was able to void 17 9 cc/second even though his bladder was filled from cystourethroscopy  Sylvester [place] PVR was 46 cc prior to instrumentation    AUA symptom score was 25 with weakened urinary stream 5/5 intermittency 5/5 urinary frequency 4/5 in urgency as well as feelings of incomplete bladder emptying 3 out a 5 with nocturia 3 times per night  The patient now presents for cystoscopy and Uro lift    Review of Systems   Genitourinary:        See HPI   All other systems reviewed and are negative  Historical Information   Past Medical History:   Diagnosis Date    BPH with obstruction/lower urinary tract symptoms     Frequency of urination     Hyperlipidemia     Nocturia      Past Surgical History:   Procedure Laterality Date    INGUINAL HERNIA REPAIR      LUNG BIOPSY       Social History   Social History     Substance and Sexual Activity   Alcohol Use Yes     Social History     Substance and Sexual Activity   Drug Use Not on file     Social History     Tobacco Use   Smoking Status Former Smoker    Last attempt to quit: 2011    Years since quittin 3   Smokeless Tobacco Never Used     Family History:   Family History   Problem Relation Age of Onset    Heart attack Mother     Brain cancer Father        Meds/Allergies   all medications and allergies reviewed  No Known Allergies    Objective   Vitals: Blood pressure 118/76, height 5' 10 5" (1 791 m), weight 84 8 kg (187 lb)  [unfilled]    Invasive Devices     None                 Physical Exam   Constitutional: He is oriented to person, place, and time  He appears well-developed and well-nourished  No distress  HENT:   Head: Normocephalic and atraumatic  Eyes: Pupils are equal, round, and reactive to light  EOM are normal    Neck: Normal range of motion  Neck supple  Cardiovascular: Normal rate, regular rhythm, normal heart sounds and intact distal pulses  Exam reveals no gallop and no friction rub  No murmur heard  Pulmonary/Chest: Effort normal and breath sounds normal  No stridor  No respiratory distress  He has no wheezes  He has no rales  He exhibits no tenderness  Abdominal: Soft   Bowel sounds are normal  He exhibits no distension and no mass  There is no tenderness  There is no rebound and no guarding  No hernia  Genitourinary:   Genitourinary Comments: Phallus normal circumcised without cutaneous lesions  Meatus patent normally placed  Scrotum normal with normal scrotal contents no masses no adnexal abnormality  MARCELO 35-40 g a nodular nontender prostate   Neurological: He is alert and oriented to person, place, and time  Skin: He is not diaphoretic  Psychiatric: He has a normal mood and affect  His behavior is normal  Judgment and thought content normal    Vitals reviewed  Lab Results: I have personally reviewed pertinent reports  Imaging: I have personally reviewed pertinent reports  EKG, Pathology, and Other Studies: I have personally reviewed pertinent reports  VTE Prophylaxis: Sequential compression device Dory Borges)     Code Status: [unfilled]  Advance Directive and Living Will:      Power of :    POLST:      Counseling / Coordination of Care  Total floor / unit time spent today 45 minutes  Greater than 50% of total time was spent with the patient and / or family counseling and / or coordination of care  A description of the counseling / coordination of care: Erin Hunter Uroflow  Date/Time: 8/8/2019 2:04 PM  Performed by: Silas Dupont MD  Authorized by: Silas Dupont MD   Procedure - Urodynamics:  Procedure details: uroflow          Post-procedure:     Patient tolerance: Patient tolerated procedure well with no immediate complications      Comments: The patient only voided 17 9 cc with a maximum rate of 2 2 and average treated 2 9 cc/second  The small amount voided makes the urinary flow rate uninterpretable

## 2019-08-12 ENCOUNTER — TELEPHONE (OUTPATIENT)
Dept: UROLOGY | Facility: MEDICAL CENTER | Age: 79
End: 2019-08-12

## 2019-08-12 PROBLEM — N40.1 BENIGN LOCALIZED HYPERPLASIA OF PROSTATE WITH URINARY OBSTRUCTION AND LOWER URINARY TRACT SYMPTOMS: Status: ACTIVE | Noted: 2019-08-12

## 2019-08-12 PROBLEM — N13.9 BENIGN LOCALIZED HYPERPLASIA OF PROSTATE WITH URINARY OBSTRUCTION AND LOWER URINARY TRACT SYMPTOMS: Status: ACTIVE | Noted: 2019-08-12

## 2019-08-12 NOTE — TELEPHONE ENCOUNTER
I spoke to the patient and scheduled him for a Urolift with Dr Trilby Heimlich at [facility] 9/6/2019, instructions given verbally and mailed

## 2019-08-19 ENCOUNTER — ANESTHESIA EVENT (OUTPATIENT)
Dept: PERIOP | Facility: HOSPITAL | Age: 79
End: 2019-08-19
Payer: COMMERCIAL

## 2019-08-20 ENCOUNTER — HOSPITAL ENCOUNTER (OUTPATIENT)
Dept: NON INVASIVE DIAGNOSTICS | Facility: HOSPITAL | Age: 79
Discharge: HOME | End: 2019-08-20
Attending: UROLOGY
Payer: COMMERCIAL

## 2019-08-20 ENCOUNTER — APPOINTMENT (OUTPATIENT)
Dept: LAB | Facility: HOSPITAL | Age: 79
End: 2019-08-20
Attending: UROLOGY
Payer: COMMERCIAL

## 2019-08-20 DIAGNOSIS — N13.8 BENIGN PROSTATIC HYPERPLASIA WITH URINARY OBSTRUCTION: ICD-10-CM

## 2019-08-20 DIAGNOSIS — N40.1 BENIGN PROSTATIC HYPERPLASIA WITH URINARY OBSTRUCTION: ICD-10-CM

## 2019-08-20 LAB
ANION GAP SERPL CALCULATED.3IONS-SCNC: 7 MMOL/L (ref 4–13)
BASOPHILS # BLD AUTO: 0.04 THOUSANDS/ΜL (ref 0–0.1)
BASOPHILS NFR BLD AUTO: 1 % (ref 0–1)
BUN SERPL-MCNC: 13 MG/DL (ref 5–25)
CALCIUM SERPL-MCNC: 9.1 MG/DL (ref 8.3–10.1)
CHLORIDE SERPL-SCNC: 107 MMOL/L (ref 100–108)
CO2 SERPL-SCNC: 27 MMOL/L (ref 21–32)
CREAT SERPL-MCNC: 0.98 MG/DL (ref 0.6–1.3)
EOSINOPHIL # BLD AUTO: 0.16 THOUSAND/ΜL (ref 0–0.61)
EOSINOPHIL NFR BLD AUTO: 3 % (ref 0–6)
ERYTHROCYTE [DISTWIDTH] IN BLOOD BY AUTOMATED COUNT: 13.1 % (ref 11.6–15.1)
GFR SERPL CREATININE-BSD FRML MDRD: 73 ML/MIN/1.73SQ M
GLUCOSE P FAST SERPL-MCNC: 100 MG/DL (ref 65–99)
HCT VFR BLD AUTO: 45.9 % (ref 36.5–49.3)
HGB BLD-MCNC: 15 G/DL (ref 12–17)
IMM GRANULOCYTES # BLD AUTO: 0.03 THOUSAND/UL (ref 0–0.2)
IMM GRANULOCYTES NFR BLD AUTO: 1 % (ref 0–2)
LYMPHOCYTES # BLD AUTO: 1.06 THOUSANDS/ΜL (ref 0.6–4.47)
LYMPHOCYTES NFR BLD AUTO: 20 % (ref 14–44)
MCH RBC QN AUTO: 30 PG (ref 26.8–34.3)
MCHC RBC AUTO-ENTMCNC: 32.7 G/DL (ref 31.4–37.4)
MCV RBC AUTO: 92 FL (ref 82–98)
MONOCYTES # BLD AUTO: 0.33 THOUSAND/ΜL (ref 0.17–1.22)
MONOCYTES NFR BLD AUTO: 6 % (ref 4–12)
NEUTROPHILS # BLD AUTO: 3.71 THOUSANDS/ΜL (ref 1.85–7.62)
NEUTS SEG NFR BLD AUTO: 69 % (ref 43–75)
NRBC BLD AUTO-RTO: 0 /100 WBCS
PLATELET # BLD AUTO: 229 THOUSANDS/UL (ref 149–390)
PMV BLD AUTO: 8.9 FL (ref 8.9–12.7)
POTASSIUM SERPL-SCNC: 4.4 MMOL/L (ref 3.5–5.3)
RBC # BLD AUTO: 5 MILLION/UL (ref 3.88–5.62)
SODIUM SERPL-SCNC: 141 MMOL/L (ref 136–145)
WBC # BLD AUTO: 5.33 THOUSAND/UL (ref 4.31–10.16)

## 2019-08-20 PROCEDURE — 87086 URINE CULTURE/COLONY COUNT: CPT

## 2019-08-20 PROCEDURE — 80048 BASIC METABOLIC PNL TOTAL CA: CPT

## 2019-08-20 PROCEDURE — 85025 COMPLETE CBC W/AUTO DIFF WBC: CPT

## 2019-08-20 PROCEDURE — 36415 COLL VENOUS BLD VENIPUNCTURE: CPT

## 2019-08-20 RX ORDER — IBUPROFEN 200 MG
200 TABLET ORAL EVERY 6 HOURS PRN
COMMUNITY

## 2019-08-20 NOTE — ANESTHESIA PREPROCEDURE EVALUATION
Review of Systems/Medical History  Patient summary reviewed  Chart reviewed  No history of anesthetic complications     Cardiovascular  Hyperlipidemia, Valvular heart disease (mild AS) , aortic valve stenosis,    Pulmonary  Negative pulmonary ROS        GI/Hepatic  Negative GI/hepatic ROS          Negative  ROS Prostatic disorder, benign prostatic hyperplasia       Endo/Other  Negative endo/other ROS      GYN       Hematology  Negative hematology ROS      Musculoskeletal  Negative musculoskeletal ROS        Neurology    Visual impairment (glaucoma),    Psychology   Negative psychology ROS              Physical Exam    Airway    Mallampati score: II  TM Distance: >3 FB  Neck ROM: full     Dental   No notable dental hx     Cardiovascular  Rhythm: regular, Rate: normal, Murmur, Cardiovascular exam normal    Pulmonary  Pulmonary exam normal Breath sounds clear to auscultation,     Other Findings        Anesthesia Plan  ASA Score- 3     Anesthesia Type- IV sedation with anesthesia with ASA Monitors  Additional Monitors:   Airway Plan:     Comment: GA prn  Plan Factors-Patient not instructed to abstain from smoking on day of procedure  Patient did not smoke on day of surgery  Induction- intravenous  Postoperative Plan-     Informed Consent- Anesthetic plan and risks discussed with patient

## 2019-08-20 NOTE — PRE-PROCEDURE INSTRUCTIONS
Pre-Surgery Instructions:   Medication Instructions    ibuprofen (MOTRIN) 200 mg tablet Patient was instructed by Physician and understands   latanoprost (XALATAN) 0 005 % ophthalmic solution Patient was instructed by Physician and understands   Multiple Vitamin (MULTIVITAMIN) capsule Patient was instructed by Physician and understands   tamsulosin (FLOMAX) 0 4 mg Patient was instructed by Physician and understands  Seen by Dr Prudence Todd and was told to stop NSAIDS and supplements one week preop and no medications are needed on morning of surgery

## 2019-08-21 LAB — BACTERIA UR CULT: NORMAL

## 2019-09-06 ENCOUNTER — HOSPITAL ENCOUNTER (OUTPATIENT)
Facility: HOSPITAL | Age: 79
Setting detail: OUTPATIENT SURGERY
Discharge: HOME | End: 2019-09-06
Attending: UROLOGY | Admitting: UROLOGY
Payer: COMMERCIAL

## 2019-09-06 ENCOUNTER — ANESTHESIA (OUTPATIENT)
Dept: PERIOP | Facility: HOSPITAL | Age: 79
End: 2019-09-06
Payer: COMMERCIAL

## 2019-09-06 VITALS
HEART RATE: 57 BPM | WEIGHT: 189.4 LBS | SYSTOLIC BLOOD PRESSURE: 136 MMHG | BODY MASS INDEX: 26.52 KG/M2 | HEIGHT: 71 IN | OXYGEN SATURATION: 99 % | TEMPERATURE: 97.4 F | DIASTOLIC BLOOD PRESSURE: 87 MMHG | RESPIRATION RATE: 20 BRPM

## 2019-09-06 DIAGNOSIS — N13.9 BENIGN LOCALIZED HYPERPLASIA OF PROSTATE WITH URINARY OBSTRUCTION AND LOWER URINARY TRACT SYMPTOMS: Primary | ICD-10-CM

## 2019-09-06 DIAGNOSIS — N40.1 BENIGN LOCALIZED HYPERPLASIA OF PROSTATE WITH URINARY OBSTRUCTION AND LOWER URINARY TRACT SYMPTOMS: Primary | ICD-10-CM

## 2019-09-06 PROCEDURE — 52442 CYSTO INS TRNSPRSTC IMPLT EA: CPT | Performed by: UROLOGY

## 2019-09-06 PROCEDURE — NC001 PR NO CHARGE: Performed by: UROLOGY

## 2019-09-06 PROCEDURE — L8699 PROSTHETIC IMPLANT NOS: HCPCS | Performed by: UROLOGY

## 2019-09-06 PROCEDURE — 51798 US URINE CAPACITY MEASURE: CPT | Performed by: UROLOGY

## 2019-09-06 PROCEDURE — 52441 CYSTO INSJ TRNSPRSTC 1 IMPLT: CPT | Performed by: UROLOGY

## 2019-09-06 DEVICE — IMPLANT URO PROSTATE UROLIFT: Type: IMPLANTABLE DEVICE | Site: URETHRA | Status: FUNCTIONAL

## 2019-09-06 RX ORDER — OXYCODONE HYDROCHLORIDE AND ACETAMINOPHEN 5; 325 MG/1; MG/1
1 TABLET ORAL EVERY 4 HOURS PRN
Status: DISCONTINUED | OUTPATIENT
Start: 2019-09-06 | End: 2019-09-06 | Stop reason: HOSPADM

## 2019-09-06 RX ORDER — HYDROCODONE BITARTRATE AND ACETAMINOPHEN 5; 325 MG/1; MG/1
1 TABLET ORAL EVERY 6 HOURS PRN
Qty: 10 TABLET | Refills: 0 | Status: SHIPPED | OUTPATIENT
Start: 2019-09-06 | End: 2019-09-16

## 2019-09-06 RX ORDER — GLYCOPYRROLATE 0.2 MG/ML
INJECTION INTRAMUSCULAR; INTRAVENOUS AS NEEDED
Status: DISCONTINUED | OUTPATIENT
Start: 2019-09-06 | End: 2019-09-06 | Stop reason: SURG

## 2019-09-06 RX ORDER — PROPOFOL 10 MG/ML
INJECTION, EMULSION INTRAVENOUS CONTINUOUS PRN
Status: DISCONTINUED | OUTPATIENT
Start: 2019-09-06 | End: 2019-09-06 | Stop reason: SURG

## 2019-09-06 RX ORDER — FENTANYL CITRATE/PF 50 MCG/ML
25 SYRINGE (ML) INJECTION
Status: DISCONTINUED | OUTPATIENT
Start: 2019-09-06 | End: 2019-09-06 | Stop reason: HOSPADM

## 2019-09-06 RX ORDER — ONDANSETRON 2 MG/ML
4 INJECTION INTRAMUSCULAR; INTRAVENOUS ONCE
Status: DISCONTINUED | OUTPATIENT
Start: 2019-09-06 | End: 2019-09-06 | Stop reason: HOSPADM

## 2019-09-06 RX ORDER — MAGNESIUM HYDROXIDE 1200 MG/15ML
LIQUID ORAL AS NEEDED
Status: DISCONTINUED | OUTPATIENT
Start: 2019-09-06 | End: 2019-09-06 | Stop reason: HOSPADM

## 2019-09-06 RX ORDER — FUROSEMIDE 10 MG/ML
INJECTION INTRAMUSCULAR; INTRAVENOUS AS NEEDED
Status: DISCONTINUED | OUTPATIENT
Start: 2019-09-06 | End: 2019-09-06 | Stop reason: SURG

## 2019-09-06 RX ORDER — SODIUM CHLORIDE 9 MG/ML
125 INJECTION, SOLUTION INTRAVENOUS CONTINUOUS
Status: DISCONTINUED | OUTPATIENT
Start: 2019-09-06 | End: 2019-09-06 | Stop reason: HOSPADM

## 2019-09-06 RX ORDER — CEFAZOLIN SODIUM 2 G/50ML
2000 SOLUTION INTRAVENOUS ONCE
Status: COMPLETED | OUTPATIENT
Start: 2019-09-06 | End: 2019-09-06

## 2019-09-06 RX ORDER — MIDAZOLAM HYDROCHLORIDE 1 MG/ML
INJECTION INTRAMUSCULAR; INTRAVENOUS AS NEEDED
Status: DISCONTINUED | OUTPATIENT
Start: 2019-09-06 | End: 2019-09-06 | Stop reason: SURG

## 2019-09-06 RX ORDER — METOCLOPRAMIDE HYDROCHLORIDE 5 MG/ML
INJECTION INTRAMUSCULAR; INTRAVENOUS AS NEEDED
Status: DISCONTINUED | OUTPATIENT
Start: 2019-09-06 | End: 2019-09-06 | Stop reason: SURG

## 2019-09-06 RX ORDER — FENTANYL CITRATE 50 UG/ML
INJECTION, SOLUTION INTRAMUSCULAR; INTRAVENOUS AS NEEDED
Status: DISCONTINUED | OUTPATIENT
Start: 2019-09-06 | End: 2019-09-06 | Stop reason: SURG

## 2019-09-06 RX ADMIN — GLYCOPYRROLATE 0.1 MG: 0.2 INJECTION INTRAMUSCULAR; INTRAVENOUS at 11:36

## 2019-09-06 RX ADMIN — PROPOFOL 200 MCG/KG/MIN: 10 INJECTION, EMULSION INTRAVENOUS at 11:42

## 2019-09-06 RX ADMIN — FENTANYL CITRATE 50 MCG: 50 INJECTION, SOLUTION INTRAMUSCULAR; INTRAVENOUS at 11:36

## 2019-09-06 RX ADMIN — FENTANYL CITRATE 25 MCG: 50 INJECTION INTRAMUSCULAR; INTRAVENOUS at 12:54

## 2019-09-06 RX ADMIN — MIDAZOLAM 2 MG: 1 INJECTION INTRAMUSCULAR; INTRAVENOUS at 11:36

## 2019-09-06 RX ADMIN — OXYCODONE HYDROCHLORIDE AND ACETAMINOPHEN 1 TABLET: 5; 325 TABLET ORAL at 14:26

## 2019-09-06 RX ADMIN — SODIUM CHLORIDE 125 ML/HR: 0.9 INJECTION, SOLUTION INTRAVENOUS at 12:59

## 2019-09-06 RX ADMIN — FUROSEMIDE 20 MG: 10 INJECTION, SOLUTION INTRAMUSCULAR; INTRAVENOUS at 12:05

## 2019-09-06 RX ADMIN — SODIUM CHLORIDE 125 ML/HR: 0.9 INJECTION, SOLUTION INTRAVENOUS at 09:52

## 2019-09-06 RX ADMIN — CEFAZOLIN SODIUM 2000 MG: 2 SOLUTION INTRAVENOUS at 11:37

## 2019-09-06 RX ADMIN — METOCLOPRAMIDE 5 MG: 5 INJECTION, SOLUTION INTRAMUSCULAR; INTRAVENOUS at 11:36

## 2019-09-06 NOTE — DISCHARGE INSTRUCTIONS
Discontinue tamsulosin in 3 weeks, return to office in 4 weeks  Follow up with Dr Franc Carbajal office for burton removal on Wednesday  Benign Prostatic Hypertrophy   WHAT YOU NEED TO KNOW:   Benign prostatic hypertrophy (BPH) is a condition that causes your prostate gland to grow larger than normal  The prostate gland is the male sex gland that produces a fluid that is part of semen  It is about the size of a walnut and it is located under the bladder  As the prostate grows, it can squeeze the urethra  This can block urine flow and cause urinary problems  DISCHARGE INSTRUCTIONS:   Medicines:   · Alpha blockers: This medicine relaxes the muscles in your prostate and bladder  It may help you urinate more easily  · 5 alpha reductase inhibitors: These medicines block the production of a hormone that causes the prostate to get larger  It may help to slow the growth of the prostate or shrink the prostate  · Take your medicine as directed  Contact your healthcare provider if you think your medicine is not helping or if you have side effects  Tell him of her if you are allergic to any medicine  Keep a list of the medicines, vitamins, and herbs you take  Include the amounts, and when and why you take them  Bring the list or the pill bottles to follow-up visits  Carry your medicine list with you in case of an emergency  Follow up with your healthcare provider as directed:  Write down your questions so you remember to ask them during your visits  Manage BPH:   · Do not let your bladder get too full before you empty it  Urinate when you feel the urge  · Limit alcohol  Do not drink large amounts of any liquid at one time  · Decrease the amount of salt you eat  Examples of salty foods are chips, cured meats, and canned soups  Do not use table salt  · Healthcare providers may tell you not to eat spicy foods such as chilli peppers   This may help you find out if spicy food makes your BPH symptoms worse     · You may have sex if you feel well  Contact your healthcare provider if:   · There is a large amount of blood in your urine  · Your signs and symptoms get worse  · You have a fever  · You have questions or concerns about your condition or care  Return to the emergency department if:   · You are unable to urinate  · Your bladder feels very full and painful  © 2017 [employer] Information is for End User's use only and may not be sold, redistributed or otherwise used for commercial purposes  All illustrations and images included in CareNotes® are the copyrighted property of [employer]  or [employer]  The above information is an  only  It is not intended as medical advice for individual conditions or treatments  Talk to your doctor, nurse or pharmacist before following any medical regimen to see if it is safe and effective for you  Escobedo Catheter Placement and Care   WHAT YOU NEED TO KNOW:   A Escobedo catheter is a sterile tube that is inserted into your bladder to drain urine  It is also called an indwelling urinary catheter  The tip of the catheter has a small balloon filled with solution that holds the catheter inside your bladder  DISCHARGE INSTRUCTIONS:   Seek care immediately if:   · Your catheter comes out  · You suddenly have material that looks like sand in the tubing or drainage bag  · No urine is draining into the bag and you have checked the system  · You have pain in your hip, back, pelvis, or lower abdomen  · You are confused or cannot think clearly  Contact your healthcare provider if:   · You have a fever  · You have bladder spasms for more than 1 day after the catheter is placed  · You see blood in the tubing or drainage bag  · You have a rash or itching where the catheter tube is secured to your skin  · Urine leaks from or around the catheter, tubing, or drainage bag      · The closed drainage system has accidently come open or apart  · You see a layer of crystals inside the tubing  · You have questions or concerns about your condition or care  Care for your Escobedo catheter:   · Clean your genital area 2 times every day  Clean your catheter and the area around where it was inserted  Use soap and water  Clean your anal opening and catheter area after every bowel movement  · Secure the catheter tube  so you do not pull or move the catheter  This helps prevent pain and bladder spasms  Healthcare providers will show you how to use medical tape or a strap to secure the catheter tube to your body  · Keep a closed drainage system  Your Escobedo catheter should always be attached to the drainage bag to form a closed system  Do not disconnect any part of the closed system unless you need to change the bag  Care for your drainage bag:   · Ask if a leg bag is right for you  A leg bag can be worn under your clothes  Ask your healthcare provider for more information about a leg bag  · Keep the drainage bag below the level of your waist   This helps stop urine from moving back up the tubing and into your bladder  Do not loop or kink the tubing  This can cause urine to back up and collect in your bladder  Do not let the drainage bag touch or lie on the floor  · Empty the drainage bag when needed  The weight of a full drainage bag can be painful  Empty the drainage bag every 3 to 6 hours or when it is ? full  · Clean and change the drainage bag as directed  Ask your healthcare provider how often you should change the drainage bag and what cleaning solution to use  Wear disposable gloves when you change the bag  Do not allow the end of the catheter or tubing to touch anything  Clean the ends with an alcohol pad before you reconnect them  What to do if problems develop:   · No urine is draining into the bag:      ¨ Check for kinks in the tubing and straighten them out       ¨ Check the tape or strap used to secure the catheter tube to your skin  Make sure it is not blocking the tube  ¨ Make sure you are not sitting or lying on the tubing  ¨ Make sure the urine bag is hanging below the level of your waist     · Urine leaks from or around the catheter, tubing, or drainage bag:  Check if the closed drainage system has accidently come open or apart  Clean the catheter and tubing ends with a new alcohol pad and reconnect them  Prevent an infection:   · Wash your hands often  Wash before and after you touch your catheter, tubing, or drainage bag  Use soap and water  Wear clean disposable gloves when you care for your catheter or disconnect the drainage bag  Wash your hands before you prepare or eat food  · Drink liquids as directed  Ask your healthcare provider how much liquid to drink each day and which liquids are best for you  Liquids will help flush your kidneys and bladder to help prevent infection  Follow up with your healthcare provider as directed:  Write down your questions so you remember to ask them during your visits  © 2017 [address] [facility] Information is for End User's use only and may not be sold, redistributed or otherwise used for commercial purposes  All illustrations and images included in CareNotes® are the copyrighted property of [employer]  or [employer] Montoya  The above information is an  only  It is not intended as medical advice for individual conditions or treatments  Talk to your doctor, nurse or pharmacist before following any medical regimen to see if it is safe and effective for you

## 2019-09-06 NOTE — INTERVAL H&P NOTE
/86   Pulse 58   Temp 97 8 °F (36 6 °C) (Tympanic)   Resp 16   Ht 5' 10 5" (1 791 m)   Wt 85 9 kg (189 lb 6 4 oz)   SpO2 100%   BMI 26 79 kg/m² H&P reviewed  After examining the patient I find no changes in the patients condition since the H&P had been written      Vitals:    09/06/19 0941   BP: 165/86   Pulse: 58   Resp: 16   Temp: 97 8 °F (36 6 °C)   SpO2: 100%

## 2019-09-06 NOTE — ANESTHESIA POSTPROCEDURE EVALUATION
Post-Op Assessment Note    CV Status:  Stable    Pain management: adequate     Mental Status:  Alert and awake   Hydration Status:  Euvolemic   PONV Controlled:  Controlled   Airway Patency:  Patent   Post Op Vitals Reviewed: Yes      Staff: Anesthesiologist           /75 (09/06/19 1257)    Temp     Pulse 70 (09/06/19 1257)   Resp 13 (09/06/19 1257)    SpO2 96 % (09/06/19 1257)

## 2019-09-06 NOTE — INTERIM OP NOTE
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 [N40 1, N13 9]    Procedure(s) (LRB):  CYSTOSCOPY WITH INSERTION UROLIFT (N/A) x 7 (including 1 pull through)    Surgeon(s) and Role:     * Delfina Antonio MD - Primary    Specimens:  * No specimens in log *    Estimated Blood Loss:   Minimal    Anesthesia Type:   General     Findings:    Bilobar enlargement of the lateral lobes of the prostate with visual occlusion of the bladder outlet  Good anterior prostatic urethral channel created by deploying 6 lift implants with an additional implant pulled through    Complications:   None    SIGNATURE: Delfina Antonio MD   DATE: 2019   TIME: 12:12 PM

## 2019-09-06 NOTE — OP NOTE
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 urinary tract symptoms [N40 1, N13 9]    Procedure(s) (LRB):  CYSTOSCOPY WITH INSERTION UROLIFT (N/A)    Specimen(s):  * No specimens in log *    Estimated Blood Loss:   Minimal    Drains:  Urethral Catheter Double-lumen; Latex 20 Fr  (Active)   Number of days: 0       Anesthesia Type:   General    Operative Indications:  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 urinary tract symptoms [N40 1, N13 9]       Operative Findings:  Bilobar enlargement of the lateral lobes of the prostate with visual occlusion of the bladder outlet  Good separation of the lateral lobes of the prostate anteriorly after deploying 6 Uro lift implants with 1 pull through implant    Complications:   None    Procedure and Technique:  Patient was seen in the holding area and I again reviewed Uro lift implants with the patient  He understood the possibility of infection bleeding urgency frequency urinary retention need for catheter and possible infection  Patient was taken to the operating room identified by the surgeon prepped and draped in the usualsterile fashion in the dorsal lithotomy position  A 20 Indonesian cystoscope with a 0 degree lens was used to evaluate the urethra and urinary bladder  The bladder was mildly trabeculated with the urethra being within normal limits without stricture lesion  The prostatic urethra revealed bilobar enlargement of the lateral lobes of the prostate with visual occlusion of the bladder outlet    A total of 6 Uro lift implants were deployed into the lateral lobes of the prostate as follows:  2 implants distal to the bladder neck at 10 and 2, 2 in the mid gland at 10 into and 2 at the apex at 10 into  And additional Uro lift implant was a pull-through on the right side of the prostate  No urethral tab was identified  After completion of the procedure there was mild prostatic urethral bleeding but good separation of the lobes of the prostate  All equipment was removed from the patient's prostatic urethra except for the urethral Uro lift implants and a 21 Nigerian Escobedo catheter was placed per urethra into the urinary bladder and left to straight drainage  After traction was held on the prostate for 5 minutes it was released and efflux was light pink to clear  The patient was transferred to the recovery room in good condition  There were no complications     I was present for the entire procedure and A qualified resident physician was not available    Patient Disposition:  PACU     SIGNATURE: Shaheen Henry MD  DATE: September 6, 2019  TIME: 12:15 PM

## 2019-09-06 NOTE — DISCHARGE SUMMARY
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 urinary tract symptoms [N40 1, N13 9], despite being on alpha blockade utilizing tamsulosin  The patient and I discussed TURP as well as laser TURP and Uro lift in the patient opted to proceed with Uro lift understanding risks of bleeding infection urinary retention need for catheter worsening of symptoms urgency frequency urge incontinence  The patient agreed to the procedure was admitted to [facility] on 09/06/2019 and underwent placement of 6 Uro lift implants  The patient tolerated the procedure well  No Known Allergies    Consults   None          Procedures Performed: No orders of the defined types were placed in this encounter  Hospital Course: Tolerated hospitalization well    Significant Findings, Care, Treatment and Services Provided:  Cystoscopy insertion of Uro lift implants time 6 with 1 pull through as the 7th    Complications:  None    Discharge Diagnosis:  BPH with lower urinary tract symptoms    Condition at Discharge: good     Discharge instructions/Information to patient and family:   See after visit summary for information provided to patient and family  Provisions for Follow-Up Care:  See after visit summary for information related to follow-up care and any pertinent home health orders        Disposition: Home    Planned Readmission: No    Discharge Statement   I spent 3rd minutes discharging the patient  This time was spent on the day of discharge  I had direct contact with the patient on the day of discharge  Additional documentation is required if more than 30 minutes were spent on discharge  Discharge Medications:  See after visit summary for reconciled discharge medications provided to patient and family          ?  ?

## 2019-09-09 ENCOUNTER — TELEPHONE (OUTPATIENT)
Dept: UROLOGY | Facility: MEDICAL CENTER | Age: 79
End: 2019-09-09

## 2019-09-09 NOTE — TELEPHONE ENCOUNTER
Pt post Urolift on 9/6  Scheduled for burton removal on 9/11  Urine has been clear since mid Saturday and is leaking around the burton  Per Dr Catherine Worthy, Burton can be removed tomorrow am  Spoke to pt  Appt changed to 9/10

## 2019-09-09 NOTE — TELEPHONE ENCOUNTER
TRIAGE  Patient of Dr Carolina Tiwari seen in [facility] office  S/P urolift Friday  Patient has burton catheter and constant sensation to urinate  Requesting to have catheter removed  Please call to discuss,  He can be reached at [phone]    Thank you

## 2019-09-10 ENCOUNTER — CLINICAL SUPPORT (OUTPATIENT)
Dept: UROLOGY | Facility: MEDICAL CENTER | Age: 79
End: 2019-09-10
Payer: COMMERCIAL

## 2019-09-10 VITALS
HEART RATE: 72 BPM | WEIGHT: 192 LBS | DIASTOLIC BLOOD PRESSURE: 78 MMHG | SYSTOLIC BLOOD PRESSURE: 142 MMHG | BODY MASS INDEX: 26.88 KG/M2 | HEIGHT: 71 IN

## 2019-09-10 DIAGNOSIS — N40.1 BENIGN LOCALIZED HYPERPLASIA OF PROSTATE WITH URINARY OBSTRUCTION AND LOWER URINARY TRACT SYMPTOMS: Primary | ICD-10-CM

## 2019-09-10 DIAGNOSIS — N13.9 BENIGN LOCALIZED HYPERPLASIA OF PROSTATE WITH URINARY OBSTRUCTION AND LOWER URINARY TRACT SYMPTOMS: Primary | ICD-10-CM

## 2019-09-10 PROCEDURE — 99211 OFF/OP EST MAY X REQ PHY/QHP: CPT

## 2019-09-10 NOTE — PROGRESS NOTES
9/10/2019    Milind Garcia is a [de-identified] y o  male  [id number]    Diagnosis:  BPH with urinary obstruction  Chief Complaint     Escobedo Catheter Removal      Patient presents for Escobedo Catheter removal s/p Cystoscopy with insertion UroLift on 9/6/19 with Dr Morena Hedrick  Plan:  Return in 4 weeks for Post Op check and PVR  Procedure: Escobedo removed without difficulty, patient tolerated procedure well  Urine draining clear yellow  Denies fever or urinary symptoms  Patient instructed to increase fluids, especially water,  and limit caffeine intake  To return to office if unable to void within 4-6 hours, or has increased discomfort      Vitals:    09/10/19 0756   BP: 142/78   Pulse: 72   Weight: 87 1 kg (192 lb)   Height: 5' 10 5" (1 791 m)         Kamila Nj LPN

## 2019-10-15 ENCOUNTER — OFFICE VISIT (OUTPATIENT)
Dept: UROLOGY | Facility: MEDICAL CENTER | Age: 79
End: 2019-10-15
Payer: COMMERCIAL

## 2019-10-15 VITALS
DIASTOLIC BLOOD PRESSURE: 78 MMHG | WEIGHT: 180 LBS | BODY MASS INDEX: 25.2 KG/M2 | HEART RATE: 73 BPM | SYSTOLIC BLOOD PRESSURE: 136 MMHG | HEIGHT: 71 IN

## 2019-10-15 DIAGNOSIS — N13.9 BENIGN LOCALIZED HYPERPLASIA OF PROSTATE WITH URINARY OBSTRUCTION AND LOWER URINARY TRACT SYMPTOMS: Primary | ICD-10-CM

## 2019-10-15 DIAGNOSIS — N40.1 BENIGN LOCALIZED HYPERPLASIA OF PROSTATE WITH URINARY OBSTRUCTION AND LOWER URINARY TRACT SYMPTOMS: Primary | ICD-10-CM

## 2019-10-15 LAB
POST-VOID RESIDUAL VOLUME, ML POC: 12 ML
SL AMB  POCT GLUCOSE, UA: NORMAL
SL AMB LEUKOCYTE ESTERASE,UA: NORMAL
SL AMB POCT BILIRUBIN,UA: NORMAL
SL AMB POCT BLOOD,UA: NORMAL
SL AMB POCT CLARITY,UA: CLEAR
SL AMB POCT COLOR,UA: YELLOW
SL AMB POCT KETONES,UA: NORMAL
SL AMB POCT NITRITE,UA: NORMAL
SL AMB POCT PH,UA: 5.5
SL AMB POCT SPECIFIC GRAVITY,UA: 1.02
SL AMB POCT URINE PROTEIN: NORMAL
SL AMB POCT UROBILINOGEN: 0.2

## 2019-10-15 PROCEDURE — 51798 US URINE CAPACITY MEASURE: CPT | Performed by: UROLOGY

## 2019-10-15 PROCEDURE — 99213 OFFICE O/P EST LOW 20 MIN: CPT | Performed by: UROLOGY

## 2019-10-15 PROCEDURE — 81003 URINALYSIS AUTO W/O SCOPE: CPT | Performed by: UROLOGY

## 2019-10-15 NOTE — PROGRESS NOTES
Chief complaint:  BPH with lower urinary tract symptoms    History of present illness:  [de-identified]year-old gentleman who is now 1 month status post Uro lift implants time 6  The patient notes that immediately after Uro lift experienced urinary retention and some gross hematuria which resolved with a voiding trial and a few days  The patient now notes that Uro lift has changed his life for the positive  He had a preoperative AUA symptom score of 24 and now his AUA symptom score is down to 8 with marked improvement in his urinary stream decrease in urgency and intermittency with his only persistence being some urinary frequency about half the time and nocturia down to twice nightly previously over 4 times per night  The patient currently is very pleased with the results of Uro lift  Current PVR is 12 cc with urinalysis completely normal     The patient will return to this office only on a p r n  Basis and has had an excellent response to therapy      15 minutes 100% counseling

## 2019-12-30 ENCOUNTER — TRANSCRIBE ORDERS (OUTPATIENT)
Dept: ADMINISTRATIVE | Facility: HOSPITAL | Age: 79
End: 2019-12-30

## 2019-12-30 DIAGNOSIS — R22.1 NECK MASS: Primary | ICD-10-CM

## 2020-01-02 ENCOUNTER — HOSPITAL ENCOUNTER (OUTPATIENT)
Dept: ULTRASOUND IMAGING | Facility: HOSPITAL | Age: 80
Discharge: HOME | End: 2020-01-02
Payer: COMMERCIAL

## 2020-01-02 DIAGNOSIS — R22.1 NECK MASS: ICD-10-CM

## 2020-01-02 PROCEDURE — 76536 US EXAM OF HEAD AND NECK: CPT

## 2020-05-01 ENCOUNTER — TRANSCRIBE ORDERS (OUTPATIENT)
Dept: ADMINISTRATIVE | Facility: HOSPITAL | Age: 80
End: 2020-05-01

## 2020-05-01 ENCOUNTER — APPOINTMENT (OUTPATIENT)
Dept: LAB | Facility: HOSPITAL | Age: 80
End: 2020-05-01
Payer: COMMERCIAL

## 2020-05-01 DIAGNOSIS — R97.20 ELEVATED PROSTATE SPECIFIC ANTIGEN (PSA): ICD-10-CM

## 2020-05-01 DIAGNOSIS — L55.9 SUNBURN: ICD-10-CM

## 2020-05-01 DIAGNOSIS — R73.01 IMPAIRED FASTING GLUCOSE: ICD-10-CM

## 2020-05-01 DIAGNOSIS — R73.01 IMPAIRED FASTING GLUCOSE: Primary | ICD-10-CM

## 2020-05-01 DIAGNOSIS — D64.9 ANEMIA, UNSPECIFIED TYPE: ICD-10-CM

## 2020-05-01 DIAGNOSIS — D51.0 PERNICIOUS ANEMIA: ICD-10-CM

## 2020-05-01 DIAGNOSIS — E78.5 HYPERLIPIDEMIA, UNSPECIFIED HYPERLIPIDEMIA TYPE: ICD-10-CM

## 2020-05-01 LAB
25(OH)D3 SERPL-MCNC: 12.8 NG/ML (ref 30–100)
ALBUMIN SERPL BCP-MCNC: 3.5 G/DL (ref 3.5–5)
ALP SERPL-CCNC: 114 U/L (ref 46–116)
ALT SERPL W P-5'-P-CCNC: 39 U/L (ref 12–78)
ANION GAP SERPL CALCULATED.3IONS-SCNC: 4 MMOL/L (ref 4–13)
AST SERPL W P-5'-P-CCNC: 26 U/L (ref 5–45)
BACTERIA UR QL AUTO: ABNORMAL /HPF
BASOPHILS # BLD AUTO: 0.1 THOUSANDS/ΜL (ref 0–0.1)
BASOPHILS NFR BLD AUTO: 2 % (ref 0–1)
BILIRUB SERPL-MCNC: 0.52 MG/DL (ref 0.2–1)
BILIRUB UR QL STRIP: NEGATIVE
BUN SERPL-MCNC: 12 MG/DL (ref 5–25)
CALCIUM SERPL-MCNC: 9.4 MG/DL (ref 8.3–10.1)
CHLORIDE SERPL-SCNC: 109 MMOL/L (ref 100–108)
CHOLEST SERPL-MCNC: 170 MG/DL (ref 50–200)
CLARITY UR: ABNORMAL
CO2 SERPL-SCNC: 30 MMOL/L (ref 21–32)
COLOR UR: YELLOW
CREAT SERPL-MCNC: 1.09 MG/DL (ref 0.6–1.3)
EOSINOPHIL # BLD AUTO: 0.4 THOUSAND/ΜL (ref 0–0.61)
EOSINOPHIL NFR BLD AUTO: 7 % (ref 0–6)
ERYTHROCYTE [DISTWIDTH] IN BLOOD BY AUTOMATED COUNT: 13 % (ref 11.6–15.1)
GFR SERPL CREATININE-BSD FRML MDRD: 63 ML/MIN/1.73SQ M
GLUCOSE P FAST SERPL-MCNC: 108 MG/DL (ref 65–99)
GLUCOSE UR STRIP-MCNC: NEGATIVE MG/DL
HCT VFR BLD AUTO: 45.6 % (ref 36.5–49.3)
HDLC SERPL-MCNC: 65 MG/DL
HGB BLD-MCNC: 14.8 G/DL (ref 12–17)
HGB UR QL STRIP.AUTO: NEGATIVE
HYALINE CASTS #/AREA URNS LPF: ABNORMAL /LPF
IMM GRANULOCYTES # BLD AUTO: 0.01 THOUSAND/UL (ref 0–0.2)
IMM GRANULOCYTES NFR BLD AUTO: 0 % (ref 0–2)
KETONES UR STRIP-MCNC: NEGATIVE MG/DL
LDLC SERPL CALC-MCNC: 89 MG/DL (ref 0–100)
LEUKOCYTE ESTERASE UR QL STRIP: ABNORMAL
LYMPHOCYTES # BLD AUTO: 1.08 THOUSANDS/ΜL (ref 0.6–4.47)
LYMPHOCYTES NFR BLD AUTO: 18 % (ref 14–44)
MCH RBC QN AUTO: 29.5 PG (ref 26.8–34.3)
MCHC RBC AUTO-ENTMCNC: 32.5 G/DL (ref 31.4–37.4)
MCV RBC AUTO: 91 FL (ref 82–98)
MONOCYTES # BLD AUTO: 0.42 THOUSAND/ΜL (ref 0.17–1.22)
MONOCYTES NFR BLD AUTO: 7 % (ref 4–12)
NEUTROPHILS # BLD AUTO: 3.87 THOUSANDS/ΜL (ref 1.85–7.62)
NEUTS SEG NFR BLD AUTO: 66 % (ref 43–75)
NITRITE UR QL STRIP: NEGATIVE
NON-SQ EPI CELLS URNS QL MICRO: ABNORMAL /HPF
NONHDLC SERPL-MCNC: 105 MG/DL
NRBC BLD AUTO-RTO: 0 /100 WBCS
PH UR STRIP.AUTO: 8.5 [PH]
PLATELET # BLD AUTO: 236 THOUSANDS/UL (ref 149–390)
PMV BLD AUTO: 9.6 FL (ref 8.9–12.7)
POTASSIUM SERPL-SCNC: 4.7 MMOL/L (ref 3.5–5.3)
PROT SERPL-MCNC: 7.2 G/DL (ref 6.4–8.2)
PROT UR STRIP-MCNC: NEGATIVE MG/DL
PSA SERPL-MCNC: 2.9 NG/ML (ref 0–4)
RBC # BLD AUTO: 5.02 MILLION/UL (ref 3.88–5.62)
RBC #/AREA URNS AUTO: ABNORMAL /HPF
SODIUM SERPL-SCNC: 143 MMOL/L (ref 136–145)
SP GR UR STRIP.AUTO: 1.02 (ref 1–1.03)
TRIGL SERPL-MCNC: 79 MG/DL
UROBILINOGEN UR QL STRIP.AUTO: 0.2 E.U./DL
VIT B12 SERPL-MCNC: 559 PG/ML (ref 100–900)
WBC # BLD AUTO: 5.88 THOUSAND/UL (ref 4.31–10.16)
WBC #/AREA URNS AUTO: ABNORMAL /HPF

## 2020-05-01 PROCEDURE — 80061 LIPID PANEL: CPT

## 2020-05-01 PROCEDURE — 82306 VITAMIN D 25 HYDROXY: CPT

## 2020-05-01 PROCEDURE — 82607 VITAMIN B-12: CPT

## 2020-05-01 PROCEDURE — 36415 COLL VENOUS BLD VENIPUNCTURE: CPT

## 2020-05-01 PROCEDURE — 80053 COMPREHEN METABOLIC PANEL: CPT

## 2020-05-01 PROCEDURE — 87086 URINE CULTURE/COLONY COUNT: CPT | Performed by: FAMILY MEDICINE

## 2020-05-01 PROCEDURE — 84153 ASSAY OF PSA TOTAL: CPT

## 2020-05-01 PROCEDURE — 81001 URINALYSIS AUTO W/SCOPE: CPT | Performed by: FAMILY MEDICINE

## 2020-05-01 PROCEDURE — 85025 COMPLETE CBC W/AUTO DIFF WBC: CPT

## 2020-05-02 LAB — BACTERIA UR CULT: NORMAL

## 2020-08-19 ENCOUNTER — HOSPITAL ENCOUNTER (EMERGENCY)
Facility: HOSPITAL | Age: 80
Discharge: HOME | End: 2020-08-20
Attending: EMERGENCY MEDICINE | Admitting: EMERGENCY MEDICINE
Payer: COMMERCIAL

## 2020-08-19 ENCOUNTER — APPOINTMENT (EMERGENCY)
Dept: CT IMAGING | Facility: HOSPITAL | Age: 80
End: 2020-08-19
Payer: COMMERCIAL

## 2020-08-19 VITALS
DIASTOLIC BLOOD PRESSURE: 79 MMHG | TEMPERATURE: 99 F | BODY MASS INDEX: 25.93 KG/M2 | HEIGHT: 71 IN | HEART RATE: 82 BPM | WEIGHT: 185.19 LBS | OXYGEN SATURATION: 95 % | SYSTOLIC BLOOD PRESSURE: 136 MMHG | RESPIRATION RATE: 16 BRPM

## 2020-08-19 DIAGNOSIS — N39.0 URINARY TRACT INFECTION: Primary | ICD-10-CM

## 2020-08-19 DIAGNOSIS — R93.5 ABNORMAL CT OF THE ABDOMEN: ICD-10-CM

## 2020-08-19 LAB
ALBUMIN SERPL BCP-MCNC: 3 G/DL (ref 3.5–5)
ALP SERPL-CCNC: 197 U/L (ref 46–116)
ALT SERPL W P-5'-P-CCNC: 37 U/L (ref 12–78)
ANION GAP SERPL CALCULATED.3IONS-SCNC: 8 MMOL/L (ref 4–13)
APTT PPP: 35 SECONDS (ref 23–37)
AST SERPL W P-5'-P-CCNC: 33 U/L (ref 5–45)
BACTERIA UR QL AUTO: ABNORMAL /HPF
BASOPHILS # BLD AUTO: 0.07 THOUSANDS/ΜL (ref 0–0.1)
BASOPHILS NFR BLD AUTO: 1 % (ref 0–1)
BILIRUB SERPL-MCNC: 0.5 MG/DL (ref 0.2–1)
BILIRUB UR QL STRIP: NEGATIVE
BUN SERPL-MCNC: 11 MG/DL (ref 5–25)
CALCIUM SERPL-MCNC: 9.3 MG/DL (ref 8.3–10.1)
CHLORIDE SERPL-SCNC: 102 MMOL/L (ref 100–108)
CK MB SERPL-MCNC: 0.3 NG/ML (ref 0–5)
CK MB SERPL-MCNC: <1 % (ref 0–2.5)
CK SERPL-CCNC: 287 U/L (ref 39–308)
CLARITY UR: CLEAR
CO2 SERPL-SCNC: 25 MMOL/L (ref 21–32)
COLOR UR: YELLOW
CREAT SERPL-MCNC: 1.05 MG/DL (ref 0.6–1.3)
EOSINOPHIL # BLD AUTO: 0.38 THOUSAND/ΜL (ref 0–0.61)
EOSINOPHIL NFR BLD AUTO: 4 % (ref 0–6)
ERYTHROCYTE [DISTWIDTH] IN BLOOD BY AUTOMATED COUNT: 12.5 % (ref 11.6–15.1)
GFR SERPL CREATININE-BSD FRML MDRD: 66 ML/MIN/1.73SQ M
GLUCOSE SERPL-MCNC: 92 MG/DL (ref 65–140)
GLUCOSE UR STRIP-MCNC: NEGATIVE MG/DL
HCT VFR BLD AUTO: 39.1 % (ref 36.5–49.3)
HGB BLD-MCNC: 12.8 G/DL (ref 12–17)
HGB UR QL STRIP.AUTO: NEGATIVE
IMM GRANULOCYTES # BLD AUTO: 0.04 THOUSAND/UL (ref 0–0.2)
IMM GRANULOCYTES NFR BLD AUTO: 1 % (ref 0–2)
INR PPP: 1.08 (ref 0.84–1.19)
KETONES UR STRIP-MCNC: ABNORMAL MG/DL
LACTATE SERPL-SCNC: 0.8 MMOL/L (ref 0.5–2)
LEUKOCYTE ESTERASE UR QL STRIP: ABNORMAL
LIPASE SERPL-CCNC: 51 U/L (ref 73–393)
LYMPHOCYTES # BLD AUTO: 1.11 THOUSANDS/ΜL (ref 0.6–4.47)
LYMPHOCYTES NFR BLD AUTO: 13 % (ref 14–44)
MCH RBC QN AUTO: 28.8 PG (ref 26.8–34.3)
MCHC RBC AUTO-ENTMCNC: 32.7 G/DL (ref 31.4–37.4)
MCV RBC AUTO: 88 FL (ref 82–98)
MONOCYTES # BLD AUTO: 0.74 THOUSAND/ΜL (ref 0.17–1.22)
MONOCYTES NFR BLD AUTO: 9 % (ref 4–12)
NEUTROPHILS # BLD AUTO: 6.3 THOUSANDS/ΜL (ref 1.85–7.62)
NEUTS SEG NFR BLD AUTO: 72 % (ref 43–75)
NITRITE UR QL STRIP: POSITIVE
NON-SQ EPI CELLS URNS QL MICRO: ABNORMAL /HPF
NRBC BLD AUTO-RTO: 0 /100 WBCS
PH UR STRIP.AUTO: 6 [PH]
PLATELET # BLD AUTO: 306 THOUSANDS/UL (ref 149–390)
PMV BLD AUTO: 9.4 FL (ref 8.9–12.7)
POTASSIUM SERPL-SCNC: 4.3 MMOL/L (ref 3.5–5.3)
PROT SERPL-MCNC: 7.1 G/DL (ref 6.4–8.2)
PROT UR STRIP-MCNC: NEGATIVE MG/DL
PROTHROMBIN TIME: 14.1 SECONDS (ref 11.6–14.5)
RBC # BLD AUTO: 4.45 MILLION/UL (ref 3.88–5.62)
RBC #/AREA URNS AUTO: ABNORMAL /HPF
SODIUM SERPL-SCNC: 135 MMOL/L (ref 136–145)
SP GR UR STRIP.AUTO: 1.02 (ref 1–1.03)
UROBILINOGEN UR QL STRIP.AUTO: 0.2 E.U./DL
WBC # BLD AUTO: 8.64 THOUSAND/UL (ref 4.31–10.16)
WBC #/AREA URNS AUTO: ABNORMAL /HPF

## 2020-08-19 PROCEDURE — G1004 CDSM NDSC: HCPCS

## 2020-08-19 PROCEDURE — 82553 CREATINE MB FRACTION: CPT | Performed by: PHYSICIAN ASSISTANT

## 2020-08-19 PROCEDURE — 82550 ASSAY OF CK (CPK): CPT | Performed by: PHYSICIAN ASSISTANT

## 2020-08-19 PROCEDURE — 36415 COLL VENOUS BLD VENIPUNCTURE: CPT | Performed by: PHYSICIAN ASSISTANT

## 2020-08-19 PROCEDURE — 85730 THROMBOPLASTIN TIME PARTIAL: CPT | Performed by: PHYSICIAN ASSISTANT

## 2020-08-19 PROCEDURE — 80053 COMPREHEN METABOLIC PANEL: CPT | Performed by: PHYSICIAN ASSISTANT

## 2020-08-19 PROCEDURE — 99284 EMERGENCY DEPT VISIT MOD MDM: CPT | Performed by: PHYSICIAN ASSISTANT

## 2020-08-19 PROCEDURE — 74177 CT ABD & PELVIS W/CONTRAST: CPT

## 2020-08-19 PROCEDURE — 81001 URINALYSIS AUTO W/SCOPE: CPT | Performed by: PHYSICIAN ASSISTANT

## 2020-08-19 PROCEDURE — 99284 EMERGENCY DEPT VISIT MOD MDM: CPT

## 2020-08-19 PROCEDURE — 87086 URINE CULTURE/COLONY COUNT: CPT | Performed by: PHYSICIAN ASSISTANT

## 2020-08-19 PROCEDURE — 85610 PROTHROMBIN TIME: CPT | Performed by: PHYSICIAN ASSISTANT

## 2020-08-19 PROCEDURE — 83690 ASSAY OF LIPASE: CPT | Performed by: PHYSICIAN ASSISTANT

## 2020-08-19 PROCEDURE — 96374 THER/PROPH/DIAG INJ IV PUSH: CPT

## 2020-08-19 PROCEDURE — 85025 COMPLETE CBC W/AUTO DIFF WBC: CPT | Performed by: PHYSICIAN ASSISTANT

## 2020-08-19 PROCEDURE — 83605 ASSAY OF LACTIC ACID: CPT | Performed by: PHYSICIAN ASSISTANT

## 2020-08-19 RX ORDER — KETOROLAC TROMETHAMINE 30 MG/ML
15 INJECTION, SOLUTION INTRAMUSCULAR; INTRAVENOUS ONCE
Status: COMPLETED | OUTPATIENT
Start: 2020-08-19 | End: 2020-08-19

## 2020-08-19 RX ORDER — CEPHALEXIN 250 MG/1
500 CAPSULE ORAL ONCE
Status: COMPLETED | OUTPATIENT
Start: 2020-08-20 | End: 2020-08-20

## 2020-08-19 RX ADMIN — IOHEXOL 100 ML: 350 INJECTION, SOLUTION INTRAVENOUS at 22:39

## 2020-08-19 RX ADMIN — KETOROLAC TROMETHAMINE 15 MG: 30 INJECTION, SOLUTION INTRAMUSCULAR at 21:45

## 2020-08-20 RX ORDER — CEPHALEXIN 500 MG/1
500 CAPSULE ORAL EVERY 6 HOURS SCHEDULED
Qty: 40 CAPSULE | Refills: 0 | Status: SHIPPED | OUTPATIENT
Start: 2020-08-20 | End: 2020-08-30

## 2020-08-20 RX ADMIN — CEPHALEXIN 500 MG: 250 CAPSULE ORAL at 01:24

## 2020-08-20 NOTE — DISCHARGE INSTRUCTIONS
Take Keflex as indicated  Follow-up with surgical Oncology  Follow-up with Urology  Follow-up with Colorectal  Follow-up with PCP  Follow up emergency department symptoms persist or exacerbate    Study Result     CT ABDOMEN AND PELVIS WITH IV CONTRAST     INDICATION:   Right lower quadrant  pain  COMPARISON:  None  TECHNIQUE:  CT examination of the abdomen and pelvis was performed  Axial, sagittal, and coronal 2D reformatted images were created from the source data and submitted for interpretation  Radiation dose length product (DLP) for this visit:  667 mGy-cm   This examination, like all CT scans performed in the [facility], was performed utilizing techniques to minimize radiation dose exposure, including the use of iterative   reconstruction and automated exposure control  IV Contrast:  100 mL of iohexol (OMNIPAQUE)  Enteric Contrast:  Enteric contrast was not administered  FINDINGS:     ABDOMEN     LOWER CHEST:  Mild scarring at the left lung base  Calcified granuloma noted  LIVER/BILIARY TREE:  Numerous hypoenhancing lesions in the liver with target sign and ill-defined borders measuring between 0 5 and 4 7 cm  Single hypoattenuating 1 5 cm lesion in the right lobe without enhancement could represent a cyst      GALLBLADDER:  No calcified gallstones  No pericholecystic inflammatory change  SPLEEN:  Unremarkable  PANCREAS:  Unremarkable  ADRENAL GLANDS:  Unremarkable  KIDNEYS/URETERS:  No pyelonephritis or obstructive uropathy  One or more sharply circumscribed subcentimeter renal hypodensities are noted  These lesions are too small to accurately characterize, but are statistically most likely to represent benign cortical renal cyst(s)  According to the guidelines published in   the [facility] Paper of the ACR Incidental Findings Committee (Radiology 2010), no further workup of these lesions is recommended       STOMACH AND BOWEL:  Narrowing of the descending colon just distal to the splenic flexure with 4 cm circumferential lesion  No evidence of colitis, diverticulitis or bowel obstruction  Significant amount stool in the colon proximal to the splenic   flexure        APPENDIX:  Normal appendix  ABDOMINOPELVIC CAVITY:  No ascites  No pneumoperitoneum  No lymphadenopathy  VESSELS:  No abdominal aortic aneurysm  PELVIS     REPRODUCTIVE ORGANS:  Enlarged prostate  URINARY BLADDER:  Unremarkable  ABDOMINAL WALL/INGUINAL REGIONS:  Unremarkable  OSSEOUS STRUCTURES:  Chronic L1 compression fracture  Approximately 50% loss of height centrally  No fracture fragment retropulsion  No lytic or blastic lesion  IMPRESSION:        1  Circumferential 4 cm lesion constricting the proximal descending colon just distal to the splenic flexure, concerning for malignancy  Significant amount of stool in the proximal colon may indicate chronic low-grade /partial obstruction and/or   constipation  Recommend surgical consultation  2   Numerous lesions throughout the liver measuring up to 4 6 cm, most consistent with metastatic disease  3   Enlarged prostate  No obstructive uropathy or pyelonephritis  The study was marked in [facility] for immediate notification             Workstation performed: [id number]

## 2020-08-20 NOTE — CONSULTS
Consultation - Colorectal Surgery   Rishi Sauceda 79 y o  male MRN: [id number]  Unit/Bed#: ED 26 Encounter: [id number]    Assessment/Plan     Assessment:  79 y o  M who presents with RLQ/suprapubic abdominal pain, found to have a UTI as well as incidentally found circumferential non-obstructing descending colon mass with liver mets concerning for metastatic colon cancer    Benign abdominal exam  No symptoms of obstruction     Plan:  Patient with a circumferential non-obstructing descending colon lesion that is asymptomatic  His RLQ/suprapubic pain is likely 2/2 his UTI  Patient will need a colonoscopy in near future to evaluate his entire large intestine for synchronous lesions as well as biopsy of this mass  Patient has been given the colorectal surgery office information and he was advised to call in the morning to make an appointment at his earliest convenience  Patient will also need follow up with medical oncology  Patient is stable for discharge home from a colorectal standpoint as this lesions is asymptomatic  History of Present Illness     HPI:  Rishi Sauceda is a 79 y o  male with PMHx of BPH and aortic stenosis who presents with 2 days of RLQ/suprapubic abdominal pain  Patient states the pain is dull, but persisted over the last 2 days which concerned him and prompted his visit to the ED  He was evaluated in the ED and found to have a UTI and incidentally found a mass in his descending colon with liver mets  Colorectal surgery was consulted for evaluation and for coordination of outpatient follow up  Patient reports very regular BMs for the past 20 years  He used to suffer from constipation >20 years ago, but it has resolved  He had gotten regular colonoscopies until the age of 75, after which he was told her would no longer need further colonoscopic evaluation  States his last colonoscopy was normal and was about 6 years ago   He denies any nausea, vomiting, decreased appetite, weight loss, changes in BMs, or any other complaints at this time  Consults    Review of Systems   Constitutional: Negative for chills and fever  HENT: Negative  Eyes: Negative  Respiratory: Negative for chest tightness, shortness of breath and wheezing  Cardiovascular: Negative for chest pain and leg swelling  Gastrointestinal: Positive for abdominal pain (RLQ, suprapubic)  Negative for blood in stool, constipation, diarrhea, nausea and vomiting  Endocrine: Negative  Genitourinary: Negative  Negative for difficulty urinating and dysuria  Musculoskeletal: Negative  Negative for myalgias  Skin: Negative  Negative for rash and wound  Allergic/Immunologic: Negative  Neurological: Negative  Negative for dizziness  Hematological: Negative  Psychiatric/Behavioral: Negative  Historical Information   Past Medical History:   Diagnosis Date    Aortic stenosis     BPH with obstruction/lower urinary tract symptoms     Frequency of urination     Glaucoma     Heart murmur     Since birth    Hyperlipidemia     Nocturia     Seasonal allergies     Wears glasses      Past Surgical History:   Procedure Laterality Date    COLONOSCOPY      EGD      INGUINAL HERNIA REPAIR Right     INGUINAL HERNIA REPAIR Left     LUNG BIOPSY      Left lung   Was fine    [place] CYSTOURETHRO W/IMPLANT N/A 9/6/2019    Procedure: CYSTOSCOPY WITH INSERTION UROLIFT;  Surgeon: Kanchan Colon MD;  Location: [facility] OR;  Service: Urology    TONSILLECTOMY      WISDOM TOOTH EXTRACTION       Social History   Social History     Substance and Sexual Activity   Alcohol Use Yes    Comment: 1 glass of wine 5 times weekly     Social History     Substance and Sexual Activity   Drug Use Never     E-Cigarette/Vaping    E-Cigarette Use Never User      E-Cigarette/Vaping Substances     Social History     Tobacco Use   Smoking Status Former Smoker    Packs/day: 0 50    Years: 4 00    Pack years: 2 00    Types: Cigarettes    Last attempt to quit: Esau Salas Years since quittin 6   Smokeless Tobacco Never Used     Family History: non-contributory    Meds/Allergies   current meds:   No current facility-administered medications for this encounter  No Known Allergies    Objective   First Vitals:   Blood Pressure: 136/79 (20)  Pulse: 86 (20)  Temperature: 99 °F (37 2 °C) (20)  Temp Source: Oral (20)  Respirations: 18 (20)  Height: 5' 10 5" (179 1 cm) (20)  Weight - Scale: 84 kg (185 lb 3 oz) (20)  SpO2: 98 % (20)    Current Vitals:   Blood Pressure: 136/79 (20)  Pulse: 82 (20)  Temperature: 99 °F (37 2 °C) (20)  Temp Source: Oral (20)  Respirations: 16 (20)  Height: 5' 10 5" (179 1 cm) (20)  Weight - Scale: 84 kg (185 lb 3 oz) (20)  SpO2: 95 % (20)    No intake or output data in the 24 hours ending 20 0316    Invasive Devices     None                 Physical Exam  Vitals signs and nursing note reviewed  Constitutional:       General: He is not in acute distress  Appearance: He is well-developed  He is not diaphoretic  HENT:      Head: Normocephalic and atraumatic  Eyes:      General: No scleral icterus  Pupils: Pupils are equal, round, and reactive to light  Neck:      Musculoskeletal: Normal range of motion  Vascular: No JVD  Cardiovascular:      Rate and Rhythm: Normal rate and regular rhythm  Heart sounds: Normal heart sounds  Pulmonary:      Effort: Pulmonary effort is normal  No respiratory distress  Abdominal:      General: There is no distension  Palpations: Abdomen is soft  Tenderness: There is no abdominal tenderness  There is no guarding or rebound  Musculoskeletal: Normal range of motion  General: No deformity  Skin:     General: Skin is warm and dry     Neurological:      Mental Status: He is alert and oriented to person, place, and time  Cranial Nerves: No cranial nerve deficit  Lab Results:   CBC:   Lab Results   Component Value Date    WBC 8 64 08/19/2020    HGB 12 8 08/19/2020    HCT 39 1 08/19/2020    MCV 88 08/19/2020     08/19/2020    MCH 28 8 08/19/2020    MCHC 32 7 08/19/2020    RDW 12 5 08/19/2020    MPV 9 4 08/19/2020    NRBC 0 08/19/2020   , CMP:   Lab Results   Component Value Date    SODIUM 135 (L) 08/19/2020    K 4 3 08/19/2020     08/19/2020    CO2 25 08/19/2020    BUN 11 08/19/2020    CREATININE 1 05 08/19/2020    CALCIUM 9 3 08/19/2020    AST 33 08/19/2020    ALT 37 08/19/2020    ALKPHOS 197 (H) 08/19/2020    EGFR 66 08/19/2020     Imaging: I have personally reviewed pertinent reports  8/19 CTAP: IMPRESSION:  1  Circumferential 4 cm lesion constricting the proximal descending colon just distal to the splenic flexure, concerning for malignancy  Significant amount of stool in the proximal colon may indicate chronic low-grade /partial obstruction and/or   constipation  Recommend surgical consultation  2   Numerous lesions throughout the liver measuring up to 4 6 cm, most consistent with metastatic disease  3   Enlarged prostate  No obstructive uropathy or pyelonephritis  EKG, Pathology, and Other Studies: I have personally reviewed pertinent reports  Counseling / Coordination of Care  Total floor / unit time spent today 20 minutes  Greater than 50% of total time was spent with the patient and / or family counseling and / or coordination of care    A description of the counseling / coordination of care: discussion with patient and surgical team

## 2020-08-20 NOTE — ED PROVIDER NOTES
History  Chief Complaint   Patient presents with    Abdominal Pain     Pt reports RLQ abdominal pain  Pt reports it is painful when he coughs  Pt denies constipation, diarhea, issues with urination, nausea and vomiting  Patient is an 48-year-old male with history of BPH, aortic stenosis, hyperlipidemia, and inguinal hernia repair the presents emergency department with intermittent worsening nonradiating right lower quadrant abdominal pain for 2 weeks  Patient denies associated symptomatology  Patient denies upper respiratory symptomatology at this time, however patient states that when he gets a tickle in the back of my throat and coughs, my right lower quadrant of abdomen hurts "  Patient states that also that his older brother had passed away due to a ruptured appendix in 2010  Patient denies new medications  Patient denies recent antibiotics  Patient denies palliative factors with provocative factors of pressure to right lower quadrant of abdomen and coughing  Patient denies not effective treatment  Patient denies fevers, chills, nausea, vomiting, diarrhea, constipation urinary symptoms  Patient's recent fall or recent trauma  Patient sick contacts or recent travel  Patient denies back pain  Patient denies history of kidney stones  Patient denies chest pain and shortness of breath  History provided by:  Patient   used: No    Abdominal Pain   Pain location:  RLQ  Pain quality: aching    Pain radiates to:  Does not radiate  Pain severity:  Mild  Onset quality:  Gradual  Duration:  2 weeks  Timing:  Intermittent  Progression:  Worsening  Chronicity:  New  Context: not alcohol use, not awakening from sleep, not diet changes, not eating, not medication withdrawal, not previous surgeries, not recent illness, not recent sexual activity, not retching and not sick contacts    Relieved by:  Nothing  Worsened by:   Movement, palpation and position changes  Ineffective treatments: None tried  Associated symptoms: no chest pain, no chills, no constipation, no cough, no diarrhea, no dysuria, no fatigue, no fever, no nausea, no shortness of breath, no sore throat and no vomiting    Risk factors: being elderly    Risk factors: no alcohol abuse and has not had multiple surgeries        Prior to Admission Medications   Prescriptions Last Dose Informant Patient Reported? Taking? Multiple Vitamin (MULTIVITAMIN) capsule  Self Yes No   Sig: Take 1 capsule by mouth daily   ibuprofen (MOTRIN) 200 mg tablet   Yes No   Sig: Take 200 mg by mouth every 6 (six) hours as needed for mild pain   latanoprost (XALATAN) 0 005 % ophthalmic solution  Self Yes No   Sig: Administer 1 drop to both eyes daily at bedtime   lovastatin (MEVACOR) 20 mg tablet  Self Yes No   Sig: Take 20 mg by mouth daily at bedtime       Facility-Administered Medications: None       Past Medical History:   Diagnosis Date    Aortic stenosis     BPH with obstruction/lower urinary tract symptoms     Frequency of urination     Glaucoma     Heart murmur     Since birth    Hyperlipidemia     Nocturia     Seasonal allergies     Wears glasses        Past Surgical History:   Procedure Laterality Date    COLONOSCOPY      EGD      INGUINAL HERNIA REPAIR Right     INGUINAL HERNIA REPAIR Left     LUNG BIOPSY      Left lung  Was fine    [place] CYSTOURETHRO W/IMPLANT N/A 9/6/2019    Procedure: Yolande Field WITH INSERTION UROLIFT;  Surgeon: Tameka Hurley MD;  Location: [facility];  Service: Urology    TONSILLECTOMY      WISDOM TOOTH EXTRACTION         Family History   Problem Relation Age of Onset    Heart attack Mother     Brain cancer Father      I have reviewed and agree with the history as documented      E-Cigarette/Vaping    E-Cigarette Use Never User      E-Cigarette/Vaping Substances     Social History     Tobacco Use    Smoking status: Former Smoker     Packs/day: 0 50     Years: 4 00     Pack years: 2 00     Types: Cigarettes Last attempt to quit:      Years since quittin 6    Smokeless tobacco: Never Used   Substance Use Topics    Alcohol use: Yes     Comment: 1 glass of wine 5 times weekly    Drug use: Never       Review of Systems   Constitutional: Negative for activity change, appetite change, chills, fatigue and fever  HENT: Negative for congestion, postnasal drip, rhinorrhea, sinus pressure, sinus pain, sore throat and tinnitus  Eyes: Negative for photophobia and visual disturbance  Respiratory: Negative for cough, chest tightness and shortness of breath  Cardiovascular: Negative for chest pain and palpitations  Gastrointestinal: Positive for abdominal pain  Negative for constipation, diarrhea, nausea and vomiting  Genitourinary: Negative for difficulty urinating, dysuria, flank pain, frequency and urgency  Musculoskeletal: Negative for back pain, gait problem, neck pain and neck stiffness  Skin: Negative for pallor and rash  Allergic/Immunologic: Negative for environmental allergies and food allergies  Neurological: Negative for dizziness, weakness, numbness and headaches  Psychiatric/Behavioral: Negative for confusion  All other systems reviewed and are negative  Physical Exam  Physical Exam  Vitals signs and nursing note reviewed  Constitutional:       General: He is awake  Appearance: Normal appearance  He is well-developed  He is not ill-appearing, toxic-appearing or diaphoretic  Comments: /79 (BP Location: Right arm)   Pulse 86   Temp 99 °F (37 2 °C) (Oral)   Resp 18   Ht 5' 10 5" (1 791 m)   Wt 84 kg (185 lb 3 oz)   SpO2 98%   BMI 26 20 kg/m²      HENT:      Head: Normocephalic and atraumatic  Right Ear: Hearing and external ear normal  No decreased hearing noted  No drainage, swelling or tenderness  No mastoid tenderness  Left Ear: Hearing and external ear normal  No decreased hearing noted  No drainage, swelling or tenderness   No mastoid tenderness  Nose: Nose normal       Mouth/Throat:      Lips: Pink  Mouth: Mucous membranes are moist       Pharynx: Oropharynx is clear  Uvula midline  Eyes:      General: Lids are normal  Vision grossly intact  Right eye: No discharge  Left eye: No discharge  Extraocular Movements: Extraocular movements intact  Conjunctiva/sclera: Conjunctivae normal       Pupils: Pupils are equal, round, and reactive to light  Neck:      Musculoskeletal: Full passive range of motion without pain, normal range of motion and neck supple  Normal range of motion  No neck rigidity, spinous process tenderness or muscular tenderness  Vascular: No JVD  Trachea: Trachea and phonation normal  No tracheal tenderness or tracheal deviation  Cardiovascular:      Rate and Rhythm: Normal rate and regular rhythm  Pulses: Normal pulses  Radial pulses are 2+ on the right side and 2+ on the left side  Posterior tibial pulses are 2+ on the right side and 2+ on the left side  Heart sounds: Normal heart sounds  Pulmonary:      Effort: Pulmonary effort is normal       Breath sounds: Normal breath sounds  No stridor  No decreased breath sounds, wheezing, rhonchi or rales  Chest:      Chest wall: No tenderness  Abdominal:      General: Abdomen is flat  Bowel sounds are normal  There is no distension  Palpations: Abdomen is soft  Abdomen is not rigid  Tenderness: There is abdominal tenderness in the right lower quadrant  There is no right CVA tenderness, left CVA tenderness, guarding or rebound  Positive signs include McBurney's sign  Musculoskeletal: Normal range of motion  Lymphadenopathy:      Head:      Right side of head: No submental, submandibular, tonsillar, preauricular, posterior auricular or occipital adenopathy  Left side of head: No submental, submandibular, tonsillar, preauricular, posterior auricular or occipital adenopathy        Cervical: No cervical adenopathy  Right cervical: No superficial, deep or posterior cervical adenopathy  Left cervical: No superficial, deep or posterior cervical adenopathy  Skin:     General: Skin is warm  Capillary Refill: Capillary refill takes less than 2 seconds  Neurological:      General: No focal deficit present  Mental Status: He is alert and oriented to person, place, and time  GCS: GCS eye subscore is 4  GCS verbal subscore is 5  GCS motor subscore is 6  Sensory: No sensory deficit  Deep Tendon Reflexes: Reflexes are normal and symmetric  Reflex Scores:       Patellar reflexes are 2+ on the right side and 2+ on the left side  Psychiatric:         Mood and Affect: Mood normal          Speech: Speech normal          Behavior: Behavior normal  Behavior is cooperative  Thought Content:  Thought content normal          Judgment: Judgment normal          Vital Signs  ED Triage Vitals   Temperature Pulse Respirations Blood Pressure SpO2   08/19/20 2016 08/19/20 2016 08/19/20 2016 08/19/20 2016 08/19/20 2016   99 °F (37 2 °C) 86 18 136/79 98 %      Temp Source Heart Rate Source Patient Position - Orthostatic VS BP Location FiO2 (%)   08/19/20 2016 08/19/20 2016 08/19/20 2016 08/19/20 2016 --   Oral Monitor Sitting Right arm       Pain Score       08/19/20 2145       1           Vitals:    08/19/20 2016 08/19/20 2318   BP: 136/79 136/79   Pulse: 86 82   Patient Position - Orthostatic VS: Sitting Lying         Visual Acuity      ED Medications  Medications   ketorolac (TORADOL) injection 15 mg (15 mg Intravenous Given 8/19/20 2145)   iohexol (OMNIPAQUE) 350 MG/ML injection (MULTI-DOSE) 100 mL (100 mL Intravenous Given 8/19/20 2239)   cephalexin (KEFLEX) capsule 500 mg (500 mg Oral Given 8/20/20 0124)       Diagnostic Studies  Results Reviewed     Procedure Component Value Units Date/Time    CKMB [[id number]]  (Normal) Collected:  08/19/20 2143    Lab Status:  Final result Specimen:  Blood from Arm, Right Updated:  08/19/20 2238     CK-MB Index <1 0 %      CK-MB 0 3 ng/mL     Lipase [[id number]]  (Abnormal) Collected:  08/19/20 2143    Lab Status:  Final result Specimen:  Blood from Arm, Right Updated:  08/19/20 2235     Lipase 51 u/L     CK Total with Reflex CKMB [[id number]]  (Normal) Collected:  08/19/20 2143    Lab Status:  Final result Specimen:  Blood from Arm, Right Updated:  08/19/20 2234     Total  U/L     Lactic acid [[id number]]  (Normal) Collected:  08/19/20 2143    Lab Status:  Final result Specimen:  Blood from Arm, Right Updated:  08/19/20 2226     LACTIC ACID 0 8 mmol/L     Narrative:       Result may be elevated if tourniquet was used during collection  Urine culture [[id number]] Collected:  08/19/20 2112    Lab Status:   In process Specimen:  Urine, Clean Catch Updated:  08/19/20 2225    CMP [[id number]]  (Abnormal) Collected:  08/19/20 2143    Lab Status:  Final result Specimen:  Blood from Arm, Right Updated:  08/19/20 2213     Sodium 135 mmol/L      Potassium 4 3 mmol/L      Chloride 102 mmol/L      CO2 25 mmol/L      ANION GAP 8 mmol/L      BUN 11 mg/dL      Creatinine 1 05 mg/dL      Glucose 92 mg/dL      Calcium 9 3 mg/dL      AST 33 U/L      ALT 37 U/L      Alkaline Phosphatase 197 U/L      Total Protein 7 1 g/dL      Albumin 3 0 g/dL      Total Bilirubin 0 50 mg/dL      eGFR 66 ml/min/1 73sq m     Narrative:       [place] guidelines for Chronic Kidney Disease (CKD):     Stage 1 with normal or high GFR (GFR > 90 mL/min/1 73 square meters)    Stage 2 Mild CKD (GFR = 60-89 mL/min/1 73 square meters)    Stage 3A Moderate CKD (GFR = 45-59 mL/min/1 73 square meters)    Stage 3B Moderate CKD (GFR = 30-44 mL/min/1 73 square meters)    Stage 4 Severe CKD (GFR = 15-29 mL/min/1 73 square meters)    Stage 5 End Stage CKD (GFR <15 mL/min/1 73 square meters)  Note: GFR calculation is accurate only with a steady state creatinine Protime-INR [[id number]]  (Normal) Collected:  08/19/20 2143    Lab Status:  Final result Specimen:  Blood from Arm, Right Updated:  08/19/20 2206     Protime 14 1 seconds      INR 1 08    APTT [[id number]]  (Normal) Collected:  08/19/20 2143    Lab Status:  Final result Specimen:  Blood from Arm, Right Updated:  08/19/20 2206     PTT 35 seconds     Urine Microscopic [[id number]]  (Abnormal) Collected:  08/19/20 2112    Lab Status:  Final result Specimen:  Urine, Clean Catch Updated:  08/19/20 2200     RBC, UA 0-1 /hpf      WBC, UA 4-10 /hpf      Epithelial Cells Occasional /hpf      Bacteria, UA Occasional /hpf     CBC and differential [[id number]]  (Abnormal) Collected:  08/19/20 2143    Lab Status:  Final result Specimen:  Blood from Arm, Right Updated:  08/19/20 2158     WBC 8 64 Thousand/uL      RBC 4 45 Million/uL      Hemoglobin 12 8 g/dL      Hematocrit 39 1 %      MCV 88 fL      MCH 28 8 pg      MCHC 32 7 g/dL      RDW 12 5 %      MPV 9 4 fL      Platelets 461 Thousands/uL      nRBC 0 /100 WBCs      Neutrophils Relative 72 %      Immat GRANS % 1 %      Lymphocytes Relative 13 %      Monocytes Relative 9 %      Eosinophils Relative 4 %      Basophils Relative 1 %      Neutrophils Absolute 6 30 Thousands/µL      Immature Grans Absolute 0 04 Thousand/uL      Lymphocytes Absolute 1 11 Thousands/µL      Monocytes Absolute 0 74 Thousand/µL      Eosinophils Absolute 0 38 Thousand/µL      Basophils Absolute 0 07 Thousands/µL     UA w Reflex to Microscopic w Reflex to Culture [[id number]]  (Abnormal) Collected:  08/19/20 2112    Lab Status:  Final result Specimen:  Urine, Clean Catch Updated:  08/19/20 2143     Color, UA Yellow     Clarity, UA Clear     Specific [place], UA 1 020     pH, UA 6 0     Leukocytes, UA Small     Nitrite, UA Positive     Protein, UA Negative mg/dl      Glucose, UA Negative mg/dl      Ketones, UA 15 (1+) mg/dl      Urobilinogen, UA 0 2 E U /dl      Bilirubin, UA Negative     Blood, UA Negative CT abdomen pelvis with contrast   ED Interpretation by Earlis Sandhoff, PA-C (08/19 7304)   Toma Gordon MD  [phone]  8/19/2020  STAT     Narrative & Impression     CT ABDOMEN AND PELVIS WITH IV CONTRAST     INDICATION:   Right lower quadrant  pain      COMPARISON:  None      TECHNIQUE:  CT examination of the abdomen and pelvis was performed  Axial, sagittal, and coronal 2D reformatted images were created from the source data and submitted for interpretation      Radiation dose length product (DLP) for this visit:  917 05 503 mGy-cm   This examination, like all CT scans performed in the [facility], was performed utilizing techniques to minimize radiation dose exposure, including the use of iterative   reconstruction and automated exposure control      IV Contrast:  100 mL of iohexol (OMNIPAQUE)  Enteric Contrast:  Enteric contrast was not administered      FINDINGS:     ABDOMEN     LOWER CHEST:  Mild scarring at the left lung base  Calcified granuloma noted      LIVER/BILIARY TREE:  Numerous hypoenhancing lesions in the liver with target sign and ill   -defined borders measuring between 0 5 and 4 7 cm  Single hypoattenuating 1 5 cm lesion in the right lobe without enhancement could represent a cyst      GALLBLADDER:  No calcified gallstones  No pericholecystic inflammatory change      SPLEEN:  Unremarkable      PANCREAS:  Unremarkable      ADRENAL GLANDS:  Unremarkable      KIDNEYS/URETERS:  No pyelonephritis or obstructive uropathy  One or more sharply circumscribed subcentimeter renal hypodensities are noted  These lesions are too small to accurately characterize, but are statistically most likely to represent benign cortical renal cyst(s)    According to the guidelines published in   the [facility] Paper of the ACR Incidental Findings Committee (Radiology 2010), no further workup of these lesions is recommended      STOMACH AND BOWEL:  Narrowing of the descending colon just distal to the splenic flexure with 4 cm circumferential lesion  No evidence of colitis, diverticulitis or bowel obstruction  Significant amount stool in the col   on proximal to the splenic   flexure        APPENDIX:  Normal appendix      ABDOMINOPELVIC CAVITY:  No ascites  No pneumoperitoneum  No lymphadenopathy      VESSELS:  No abdominal aortic aneurysm      PELVIS     REPRODUCTIVE ORGANS:  Enlarged prostate      URINARY BLADDER:  Unremarkable      ABDOMINAL WALL/INGUINAL REGIONS:  Unremarkable      OSSEOUS STRUCTURES:  Chronic L1 compression fracture  Approximately 50% loss of height centrally  No fracture fragment retropulsion  No lytic or blastic lesion      IMPRESSION:        1  Circumferential 4 cm lesion constricting the proximal descending colon just distal to the splenic flexure, concerning for malignancy  Significant amount of stool in the proximal colon may indicate chronic low-grade /partial obstruction and/or   constipation  Recommend surgical consultation  2   Numerous lesions throughout the liver measuring up to 4 6 cm, most consistent with metastatic disease  3   Enlarged prostate  No obstructive uropathy or pyelonep   hritis      The study was marked in EPIC for immediate notification            Workstation performed: [id number]          Final Result by Florence Hernandez MD (08/19 2319)         1  Circumferential 4 cm lesion constricting the proximal descending colon just distal to the splenic flexure, concerning for malignancy  Significant amount of stool in the proximal colon may indicate chronic low-grade /partial obstruction and/or    constipation  Recommend surgical consultation  2   Numerous lesions throughout the liver measuring up to 4 6 cm, most consistent with metastatic disease  3   Enlarged prostate  No obstructive uropathy or pyelonephritis  The study was marked in [facility] for immediate notification              Workstation performed: [id number] Procedures  Procedures         ED Course  ED Course as of Aug 20 0359   Wed Aug 19, 2020   2157 Nitrite, UA(!): Positive   2234 Patient verbal understanding all clinical laboratory findings at this time  2234 Patient verbalized decrease in right lower quadrant abdominal pain symptomatology status post medication delivery  Patient states that he had urinary tract infection at 35years old and responded well to outpatient antibiotic treatment  2347 Surgical resident contacted through tiger text  On call curgical oncologist contacted through tiger text      22 [id number] CT abdomen pelvis with contrast- impression- "1   Circumferential 4 cm lesion constricting the proximal descending colon just distal to the splenic flexure, concerning for malignancy   Significant amount of stool in the proximal colon may indicate chronic low-grade /partial obstruction and/or   constipation   Recommend surgical consultation  2   Numerous lesions throughout the liver measuring up to 4 6 cm, most consistent with metastatic disease  3   Enlarged prostate   No obstructive uropathy or pyelonephritis "          US AUDIT      Most Recent Value   Initial Alcohol Screen: US AUDIT-C    1  How often do you have a drink containing alcohol? 5 Filed at: 08/19/2020 2014   2  How many drinks containing alcohol do you have on a typical day you are drinking? 0 Filed at: 08/19/2020 2014   3b  FEMALE Any Age, or MALE 65+: How often do you have 4 or more drinks on one occassion? 0 Filed at: 08/19/2020 2014   Audit-C Score  5 Filed at: 08/19/2020 2014                  RIVKA/DAST-10      Most Recent Value   How many times in the past year have you    Used an illegal drug or used a prescription medication for non-medical reasons?   Never Filed at: 08/19/2020 2014                                MDM  Number of Diagnoses or Management Options  Abnormal CT of the abdomen: new and requires workup  Urinary tract infection: new and requires workup Amount and/or Complexity of Data Reviewed  Clinical lab tests: ordered and reviewed  Tests in the radiology section of CPT®: ordered and reviewed  Review and summarize past medical records: yes  Independent visualization of images, tracings, or specimens: yes    Risk of Complications, Morbidity, and/or Mortality  Presenting problems: moderate  Diagnostic procedures: moderate  Management options: low    Patient Progress  Patient progress: stable    Patient is an 34-year-old male with history of BPH, aortic stenosis, hyperlipidemia, and inguinal hernia repair the presents emergency department with intermittent worsening nonradiating right lower quadrant abdominal pain for 2 weeks  Patient denies associated symptomatology  Patient hemodynamically stable and afebrile  CT abdomen pelvis with contrast-impression-   1   Circumferential 4 cm lesion constricting the proximal descending colon just distal to the splenic flexure, concerning for malignancy  Significant amount of stool in the proximal colon may indicate chronic low-grade /partial obstruction and/or   constipation  Recommend surgical consultation  2   Numerous lesions throughout the liver measuring up to 4 6 cm, most consistent with metastatic disease  3   Enlarged prostate    No obstructive uropathy or pyelonephritis "  Surgery consulted and spoke with patient and will have patient follow-up outpatient with Colorectal for colonoscopy  Patient passing gas and having normal baseline bowel movements daily  Urinalysis indicates urinary tract infection; 1st dose of Keflex delivered in the ED; will have patient follow-up with Urology for further evaluation  Patient has normal kidney functionality  Elevated alk phos  Delivered Toradol in the ED; patient verbalized decrease in right lower quadrant abdominal pain symptomatology status post medication delivery  Prescribed Keflex and counseled patient medication administration and side effects  Follow-up with Urology  Follow-up with surgical Oncology  Follow-up with Colorectal  Follow-up with PCP  Follow up with emergency department symptoms persist or exacerbate  Patient demonstrates verbal understanding all clinical laboratory and imaging findings, discharge instructions, follow-up and verbalized agreement with current treatment plan  Disposition  Final diagnoses:   Urinary tract infection   Abnormal CT of the abdomen - 1  Circumferential 4 cm lesion constricting the proximal descending colon just distal to the splenic flexure, concerning for malignancy  Significant amount of stool in the proximal colon may indicate chronic low-grade /partial obstruction and/or     Time reflects when diagnosis was documented in both MDM as applicable and the Disposition within this note     Time User Action Codes Description Comment    8/20/2020  1:15 AM Radha Overlie Add [N39 0] Urinary tract infection     8/20/2020  1:15 AM Radha Overlie Add [R93 5] Abnormal CT of the abdomen     8/20/2020  1:17 AM Radha Overlie Modify [R93 5] Abnormal CT of the abdomen 1  Circumferential 4 cm lesion constricting the proximal descending colon just distal to the splenic flexure, concerning for malignancy  Significant amount of stool in the proximal colon may indicate chronic low-grade /partial obstruction and/or      ED Disposition     ED Disposition Condition Date/Time Comment    Discharge Stable u Aug 20, 2020  1:14 AM Idania Walker discharge to home/self care              Follow-up Information     Follow up With Specialties Details Why Contact Info Additional Information    Surg Oncology Surgical Oncology Call in 1 week for further evaluation of symptoms Donaldo Angeles [facility]  [phone]       Adrian Bales MD Family Medicine Call in 1 week for further evaluation of symptoms [facility]  [facility] [address] [facility] Emergency Department Emergency Medicine Go to As needed Day [address]  [phone] [facility] ED, [address], [place], [place], [address] [facility] Call in 1 week for further evaluation of symptoms Rashid Gonzales [phone]  [address]  [facility] [place], [address] [facility], [place], [place], [address] [facility]    Kenji Petersen MD Colon and Rectal Surgery Call Call to schedule an appointment with one of the Colorectal surgeons at your earliest convenience  [address] [facility]             Discharge Medication List as of 8/20/2020  1:18 AM      START taking these medications    Details   cephalexin (KEFLEX) 500 mg capsule Take 1 capsule (500 mg total) by mouth every 6 (six) hours for 10 days, Starting Thu 8/20/2020, Until Sun 8/30/2020, Normal         CONTINUE these medications which have NOT CHANGED    Details   ibuprofen (MOTRIN) 200 mg tablet Take 200 mg by mouth every 6 (six) hours as needed for mild pain, Historical Med      latanoprost (XALATAN) 0 005 % ophthalmic solution Administer 1 drop to both eyes daily at bedtime, Starting Tue 5/28/2019, Historical Med      lovastatin (MEVACOR) 20 mg tablet Take 20 mg by mouth daily at bedtime , Starting Tue 1/2/2018, Historical Med      Multiple Vitamin (MULTIVITAMIN) capsule Take 1 capsule by mouth daily, Historical Med           No discharge procedures on file      PDMP Review       Value Time User    PDMP Reviewed  Yes 9/6/2019 12:21 PM Nirali Carbajal MD          ED Provider  Electronically Signed by           Mimi Guillen PA-C  08/20/20 8239

## 2020-08-21 ENCOUNTER — TELEPHONE (OUTPATIENT)
Dept: SURGICAL ONCOLOGY | Facility: CLINIC | Age: 80
End: 2020-08-21

## 2020-08-21 LAB — BACTERIA UR CULT: NORMAL

## 2020-08-21 NOTE — TELEPHONE ENCOUNTER
New GI Patient Encounter    New Patient GI Form   Patient Details:  Amber Banks  1940  [id number]     Background Information:  [address] starts by opening a telephone encounter and gathering the following information   Who is calling to schedule and relationship? self   Who is the referring provider? ER   To which specialty is the referral?  Surgical Oncology   Reason for Visit? New Diagnosis   Tumor Type?  colon   Is there a confirmed diagnosis from biopsy/tissue reviewed by Pathology?  no   Date of Tissue Diagnosis  (If done outside of [facility] please obtain report and slides)  (If no tissue diagnosis, please stop and discuss with Navigator prior to scheduling)  none   Is patient aware of the diagnosis? yes   Has Imaging been completed? yes   If YES, where was the imaging done? (If outside [place] [id number] obtain records)     Have any endoscopies been done (colonoscopy, EGD, EUS)  no   If YES where were they performed? (If outside of [facility] obtain the records)     Has blood work been done?  no   If YES, where was the blood work done? (If outside of [facility] obtain records)     Is there a personal history of cancer? (If YES please list type)  no   Is there a family history of cancer? (If YES please list type)  no   Scheduling Information:   Preferred [facility]   Are there any days the patient cannot be seen? Miscellaneous: called Mercedes and she said that Caty Barber will see the patient on Tuesday and she is going [facility]  After completing the above information, please route to finance, nurse navigation and clinical trials for review

## 2020-08-24 PROBLEM — C18.5 CANCER OF SPLENIC FLEXURE (HCC): Status: ACTIVE | Noted: 2020-08-24

## 2020-08-24 PROBLEM — C78.7 METASTASES TO THE LIVER (HCC): Status: ACTIVE | Noted: 2020-08-24

## 2020-08-24 PROBLEM — C18.6 CANCER OF DESCENDING COLON (HCC): Status: ACTIVE | Noted: 2020-08-24

## 2020-08-24 PROBLEM — C18.6 CANCER OF DESCENDING COLON (HCC): Status: RESOLVED | Noted: 2020-08-24 | Resolved: 2020-08-24

## 2020-08-24 NOTE — H&P (VIEW-ONLY)
Colon and Rectal Surgery   Alin Maguire 79 y o  male MRN [id number]  Encounter: [id number]  08/24/20 4:34 PM            Assessment: Alin Maguire is a 79 y o  male who has metastatic descending colon cancer  Plan:   Cancer of splenic flexure [facility])  He presented with a urinary tract infection to the emergency room  A CT scan revealed abnormalities in the liver  This is suggestive of liver metastases with an additional finding of splenic flexure mass extending into the descending colon  I recommend colonoscopy as soon as we can arrange it  Tissue will be sampled for specific diagnosis  I will also assess for surgical needs, as he has a suggestion of a very narrow aperture at the tumor  He is not a surgical candidate for cure because of the liver metastases  Also, the liver has multiple metastases that are broadly dispersed, therefore surgical metastasectomy is not likely to be an option  We reviewed surgical treatment if the lesion is found to be at a point of impending obstruction  Risks of surgery, including but not limited to bleeding, pain, infection, hernia formation, injury to the ureter or other internal organs requiring surgery specific to these injuries, anastomotic leak, impotence, deep vein thrombosis, pulmonary embolism, myocardial infarction or heart failure, stroke, death, need for and enterostomy, or other unspecified complications were discussed  Benefits and alternatives were discussed  Questions were answered  Hopefully, we can specify the tumor biology, refer him for oncological management, and get away without surgery  He understands the above plan  The evaluation was done in the presence of the nurse navigator  He takes care of his ex-wife who has advanced throat cancer  He understands incurable disease  He asks specific questions about what the point is of getting chemotherapy when the tumor is incurable    I explained that he will probably double his lifespan on average and may get years of survival with therapy where as that would be unlikely without therapy  I explained that even without therapy, some treatment of the splenic flexure lesion is needed  Question surrounding chemotherapy, surgery, non therapy, and hospice were freely discussed  He is obviously well-versed in these ideas  I encouraged him to maintain autonomy in his decision-making but to see the oncologist before that is finalized  Subjective     HPI    Emelia Ro is a 79 y o  male who is today for evaluation of abnormal CT scan  He was seen the hospital 08 19 2020 for RLQ abdominal pain which resolved after finishing antibiotics after UTI  He denies having rectal bleeding/nausea or vomiting  He denies a change bowel habits, having a daily soft and formed bowel movement  His last colonoscopy ~1974 with no polypectomy  He states he has had numerous colonoscopies all without polypectomies  His most recent CT scan was 08 19 2020 which showed,  -Circumferential 4 cm lesion constricting the proximal descending colon just distal to the splenic flexure, concerning for malignancy  Significant amount of stool in the proximal colon may indicate chronic low-grade /partial obstruction and/or   constipation  Recommend surgical consultation   -Numerous lesions throughout the liver measuring up to 4 6 cm, most consistent with metastatic disease   -Enlarged prostate  No obstructive uropathy or pyelonephritis  He denies family history of colorectal cancer       Historical Information   Past Medical History:   Diagnosis Date    Aortic stenosis     BPH with obstruction/lower urinary tract symptoms     Frequency of urination     Glaucoma     Heart murmur     Since birth    Hyperlipidemia     Nocturia     Seasonal allergies     Wears glasses      Past Surgical History:   Procedure Laterality Date    COLONOSCOPY      EGD      INGUINAL HERNIA REPAIR Right     INGUINAL HERNIA REPAIR Left     LUNG BIOPSY Left lung  Was fine    [place] CYSTOURETHRO W/IMPLANT N/A 2019    Procedure: CYSTOSCOPY WITH INSERTION UROLIFT;  Surgeon: Sheryle Chambers, MD;  Location: [facility];  Service: Urology    TONSILLECTOMY      WISDOM TOOTH EXTRACTION         Meds/Allergies       Current Outpatient Medications:     cephalexin (KEFLEX) 500 mg capsule, Take 1 capsule (500 mg total) by mouth every 6 (six) hours for 10 days, Disp: 40 capsule, Rfl: 0    ibuprofen (MOTRIN) 200 mg tablet, Take 200 mg by mouth every 6 (six) hours as needed for mild pain, Disp: , Rfl:     latanoprost (XALATAN) 0 005 % ophthalmic solution, Administer 1 drop to both eyes daily at bedtime, Disp: , Rfl: 3    lovastatin (MEVACOR) 20 mg tablet, Take 20 mg by mouth daily at bedtime , Disp: , Rfl:     Multiple Vitamin (MULTIVITAMIN) capsule, Take 1 capsule by mouth daily, Disp: , Rfl:   No Known Allergies    Social History   Social History     Substance and Sexual Activity   Drug Use Never     Social History     Tobacco Use   Smoking Status Former Smoker    Packs/day: 0 50    Years: 4 00    Pack years: 2 00    Types: Cigarettes    Last attempt to quit:     Years since quittin 6   Smokeless Tobacco Never Used         Family History   Problem Relation Age of Onset    Heart attack Mother     Brain cancer Father     Colon cancer Neg Hx          Review of Systems   Constitutional: Negative  Respiratory: Negative  Cardiovascular: Negative  Gastrointestinal: Negative  Objective   Current Vitals:  Vitals:    20 1529   Weight: 83 9 kg (185 lb)   Height: 5' 10" (1 778 m)         Physical Exam  Constitutional:       Appearance: Normal appearance  Cardiovascular:      Rate and Rhythm: Normal rate and regular rhythm  Pulmonary:      Effort: Pulmonary effort is normal       Breath sounds: Normal breath sounds  Abdominal:      General: Abdomen is flat  There is no distension  Palpations: Abdomen is soft  There is no mass  Tenderness: There is no abdominal tenderness  There is no guarding  Hernia: No hernia is present  Skin:     General: Skin is warm and dry  Neurological:      General: No focal deficit present  Mental Status: He is alert and oriented to person, place, and time  Psychiatric:         Mood and Affect: Mood normal          Behavior: Behavior normal          Thought Content:  Thought content normal          Judgment: Judgment normal

## 2020-08-25 ENCOUNTER — PATIENT OUTREACH (OUTPATIENT)
Dept: HEMATOLOGY ONCOLOGY | Facility: CLINIC | Age: 80
End: 2020-08-25

## 2020-08-25 NOTE — PROGRESS NOTES
Late entry: Met with patient on 8/24/20 at his consultation with Dr Saucedo Record  Introduced myself and my role  Provided patient with my business card and informed him I would be following along to assist with navigating his care to medical oncology once the colonoscopy is complete and a tissue diagnosis has been made  Advised him to call me with any questions or concerns

## 2020-08-28 ENCOUNTER — TELEPHONE (OUTPATIENT)
Dept: UROLOGY | Facility: AMBULATORY SURGERY CENTER | Age: 80
End: 2020-08-28

## 2020-08-28 ENCOUNTER — PATIENT OUTREACH (OUTPATIENT)
Dept: HEMATOLOGY ONCOLOGY | Facility: CLINIC | Age: 80
End: 2020-08-28

## 2020-08-28 NOTE — TELEPHONE ENCOUNTER
Patient managed by Dr Tony Oh, seen in the [facility] office  Patient with history of BPH, S/P Urolift 9/6/19  Patient last seen 10/15/19  At that time doing well, urinary symptoms greatly improved  Dr Tony Oh advised follow up as needed  Patient seen in the ER on 8/19/20 for Abdominal pain  CT obtained, showed KIDNEYS/URETERS:  No pyelonephritis or obstructive uropathy  One or more sharply circumscribed subcentimeter renal hypodensities are noted  These lesions are too small to accurately characterize, but are statistically most likely to represent benign cortical renal cyst(s)  According to the guidelines published in   the [facility] Paper of the ACR Incidental Findings Committee (Radiology 2010), no further workup of these lesions is recommended  REPRODUCTIVE ORGANS:  Enlarged prostate      URINARY BLADDER:  Unremarkable  UA and urine culture also obtained  Patient discharged from ER on Keflex 500 mg q6 hours for 10 days  Final urine culture showed   [id number] cfu/ml     Mixed Contaminants X2      Patient was instructed to follow up with urology  Please advise on follow up

## 2020-08-28 NOTE — PROGRESS NOTES
Received a voicemail from patient with questions regarding next steps  Returned call to patient today  He had questions regarding possible chemotherapy regimens and duration of treatment  I answered his questions to the best of my ability  He understands the need to obtain tissue to confirm the diagnosis before he can be seen by medical oncology to discuss chemotherapy  He is scheduled for a colonoscopy with Dr Aileen Maguire on 9/4/20 and I will assist in arranging the med onc consult as soon as possible after the tissue path is resulted  He appreciated my return call and the information reviewed on the call today

## 2020-08-31 ENCOUNTER — ANESTHESIA EVENT (OUTPATIENT)
Dept: GASTROENTEROLOGY | Facility: AMBULARY SURGERY CENTER | Age: 80
End: 2020-08-31

## 2020-08-31 NOTE — TELEPHONE ENCOUNTER
Call placed to patient  He answered the phone and was reviewing recommendations of CRNP at this time  Attempted to schedule follow up appointment, but he hung up the phone  If patient calls back, please schedule follow up with Deepthi Askew on 10/2, if this works for patient

## 2020-09-04 ENCOUNTER — ANESTHESIA (OUTPATIENT)
Dept: GASTROENTEROLOGY | Facility: AMBULARY SURGERY CENTER | Age: 80
End: 2020-09-04

## 2020-09-04 ENCOUNTER — HOSPITAL ENCOUNTER (OUTPATIENT)
Dept: GASTROENTEROLOGY | Facility: AMBULARY SURGERY CENTER | Age: 80
Setting detail: OUTPATIENT SURGERY
Discharge: HOME | End: 2020-09-04
Attending: COLON & RECTAL SURGERY
Payer: COMMERCIAL

## 2020-09-04 VITALS
OXYGEN SATURATION: 99 % | WEIGHT: 178 LBS | SYSTOLIC BLOOD PRESSURE: 126 MMHG | TEMPERATURE: 98 F | HEIGHT: 70 IN | RESPIRATION RATE: 18 BRPM | HEART RATE: 61 BPM | DIASTOLIC BLOOD PRESSURE: 81 MMHG | BODY MASS INDEX: 25.48 KG/M2

## 2020-09-04 VITALS — HEART RATE: 60 BPM

## 2020-09-04 DIAGNOSIS — C18.5 CANCER OF SPLENIC FLEXURE (HCC): ICD-10-CM

## 2020-09-04 DIAGNOSIS — C78.7 METASTASES TO THE LIVER (HCC): ICD-10-CM

## 2020-09-04 PROCEDURE — 45378 DIAGNOSTIC COLONOSCOPY: CPT | Performed by: COLON & RECTAL SURGERY

## 2020-09-04 RX ORDER — PROPOFOL 10 MG/ML
INJECTION, EMULSION INTRAVENOUS AS NEEDED
Status: DISCONTINUED | OUTPATIENT
Start: 2020-09-04 | End: 2020-09-04

## 2020-09-04 RX ORDER — SODIUM CHLORIDE, SODIUM LACTATE, POTASSIUM CHLORIDE, CALCIUM CHLORIDE 600; 310; 30; 20 MG/100ML; MG/100ML; MG/100ML; MG/100ML
100 INJECTION, SOLUTION INTRAVENOUS CONTINUOUS
Status: DISCONTINUED | OUTPATIENT
Start: 2020-09-04 | End: 2020-09-08 | Stop reason: HOSPADM

## 2020-09-04 RX ORDER — METRONIDAZOLE 500 MG/1
1000 TABLET ORAL 3 TIMES DAILY
Status: DISCONTINUED | OUTPATIENT
Start: 2020-09-04 | End: 2020-09-04

## 2020-09-04 RX ORDER — NEOMYCIN SULFATE 500 MG/1
1000 TABLET ORAL 3 TIMES DAILY
Status: DISCONTINUED | OUTPATIENT
Start: 2020-09-04 | End: 2020-09-04

## 2020-09-04 RX ORDER — CEFAZOLIN SODIUM 2 G/50ML
2000 SOLUTION INTRAVENOUS ONCE
Status: DISCONTINUED | OUTPATIENT
Start: 2020-09-04 | End: 2020-09-08 | Stop reason: HOSPADM

## 2020-09-04 RX ORDER — LIDOCAINE HYDROCHLORIDE 10 MG/ML
INJECTION, SOLUTION EPIDURAL; INFILTRATION; INTRACAUDAL; PERINEURAL AS NEEDED
Status: DISCONTINUED | OUTPATIENT
Start: 2020-09-04 | End: 2020-09-04

## 2020-09-04 RX ADMIN — PROPOFOL 100 MG: 10 INJECTION, EMULSION INTRAVENOUS at 08:15

## 2020-09-04 RX ADMIN — SODIUM CHLORIDE, SODIUM LACTATE, POTASSIUM CHLORIDE, AND CALCIUM CHLORIDE: .6; .31; .03; .02 INJECTION, SOLUTION INTRAVENOUS at 08:05

## 2020-09-04 RX ADMIN — LIDOCAINE HYDROCHLORIDE 50 MG: 10 INJECTION, SOLUTION EPIDURAL; INFILTRATION; INTRACAUDAL; PERINEURAL at 08:14

## 2020-09-04 RX ADMIN — PROPOFOL 20 MG: 10 INJECTION, EMULSION INTRAVENOUS at 08:19

## 2020-09-04 RX ADMIN — PROPOFOL 30 MG: 10 INJECTION, EMULSION INTRAVENOUS at 08:21

## 2020-09-04 NOTE — ANESTHESIA PREPROCEDURE EVALUATION
Procedure:  COLONOSCOPY    Relevant Problems   ANESTHESIA (within normal limits)      CARDIO  mild AS   (+) Hyperlipidemia      GI/HEPATIC   (+) Cancer of descending colon (HCC)   (+) Metastases to the liver (HCC)      /RENAL   (+) 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 urinary tract symptoms      PULMONARY (within normal limits)  remote hx DIOGENES, resolved after surgery age 35      Other   (+) Glaucoma   (+) Seasonal allergies      Physical Exam    Airway    Mallampati score: II  TM Distance: >3 FB  Neck ROM: full     Dental   No notable dental hx     Cardiovascular      Pulmonary      Other Findings        Anesthesia Plan  ASA Score- 3     Anesthesia Type- IV sedation with anesthesia with ASA Monitors  Additional Monitors:   Airway Plan:           Plan Factors-    Chart reviewed  Existing labs reviewed  Patient summary reviewed  Patient is not a current smoker  Induction- intravenous  Postoperative Plan-     Informed Consent- Anesthetic plan and risks discussed with patient  I personally reviewed this patient with the CRNA  Discussed and agreed on the Anesthesia Plan with the CRNA  Apurva Foy

## 2020-09-04 NOTE — PROGRESS NOTES
Near-obstructing colon tumor  Will need surgery first, followed by chemo for the liver metastases  Trying to push for surgery on 9/8  No obstruction symptoms

## 2020-09-04 NOTE — INTERVAL H&P NOTE
H&P reviewed  After examining the patient I find no changes in the patients condition since the H&P had been written  Repeated discussion regarding the incurability of the tumor and potential of avoiding colon surgery, the reasons to avoid it, and the need for chemotherapy      Vitals:    09/04/20 0718   BP: 134/83   Pulse: 74   Resp: 18   Temp: 98 6 °F (37 °C)   SpO2: 97%

## 2020-09-04 NOTE — ANESTHESIA POSTPROCEDURE EVALUATION
Post-Op Assessment Note    CV Status:  Stable    Pain management: adequate     Mental Status:  Alert and awake   Hydration Status:  Euvolemic   PONV Controlled:  Controlled   Airway Patency:  Patent      Post Op Vitals Reviewed: Yes      Staff: CRNA         No complications documented      BP 93/57 (09/04/20 0831)    Temp (!) 97 °F (36 1 °C) (09/04/20 0831)    Pulse 58 (09/04/20 0831)   Resp (!) 98 (09/04/20 0831)    SpO2 98 % (09/04/20 0831)

## 2020-09-07 ENCOUNTER — HOSPITAL ENCOUNTER (INPATIENT)
Facility: HOSPITAL | Age: 80
LOS: 7 days | Discharge: HOME HEALTH | DRG: 330 | End: 2020-09-14
Attending: COLON & RECTAL SURGERY | Admitting: COLON & RECTAL SURGERY
Payer: COMMERCIAL

## 2020-09-07 DIAGNOSIS — K63.89 COLONIC MASS: Primary | ICD-10-CM

## 2020-09-07 RX ORDER — GLIMEPIRIDE 2 MG/1
1 TABLET ORAL DAILY
Status: DISCONTINUED | OUTPATIENT
Start: 2020-09-07 | End: 2020-09-14 | Stop reason: HOSPADM

## 2020-09-07 RX ORDER — SODIUM CHLORIDE, SODIUM LACTATE, POTASSIUM CHLORIDE, CALCIUM CHLORIDE 600; 310; 30; 20 MG/100ML; MG/100ML; MG/100ML; MG/100ML
100 INJECTION, SOLUTION INTRAVENOUS CONTINUOUS
Status: DISCONTINUED | OUTPATIENT
Start: 2020-09-07 | End: 2020-09-08

## 2020-09-07 RX ORDER — LATANOPROST 50 UG/ML
1 SOLUTION/ DROPS OPHTHALMIC
Status: DISCONTINUED | OUTPATIENT
Start: 2020-09-07 | End: 2020-09-14 | Stop reason: HOSPADM

## 2020-09-07 RX ORDER — NEOMYCIN SULFATE 500 MG/1
500 TABLET ORAL EVERY 8 HOURS
Status: COMPLETED | OUTPATIENT
Start: 2020-09-07 | End: 2020-09-07

## 2020-09-07 RX ORDER — PRAVASTATIN SODIUM 20 MG
20 TABLET ORAL
Status: DISCONTINUED | OUTPATIENT
Start: 2020-09-07 | End: 2020-09-14 | Stop reason: HOSPADM

## 2020-09-07 RX ORDER — HEPARIN SODIUM 5000 [USP'U]/ML
5000 INJECTION, SOLUTION INTRAVENOUS; SUBCUTANEOUS EVERY 8 HOURS SCHEDULED
Status: DISCONTINUED | OUTPATIENT
Start: 2020-09-07 | End: 2020-09-08

## 2020-09-07 RX ORDER — ONDANSETRON 2 MG/ML
4 INJECTION INTRAMUSCULAR; INTRAVENOUS EVERY 6 HOURS PRN
Status: DISCONTINUED | OUTPATIENT
Start: 2020-09-07 | End: 2020-09-10

## 2020-09-07 RX ADMIN — METRONIDAZOLE 500 MG: 500 INJECTION, SOLUTION INTRAVENOUS at 22:38

## 2020-09-07 RX ADMIN — PRAVASTATIN SODIUM 20 MG: 20 TABLET ORAL at 22:38

## 2020-09-07 RX ADMIN — METRONIDAZOLE 500 MG: 500 INJECTION, SOLUTION INTRAVENOUS at 16:03

## 2020-09-07 RX ADMIN — NEOMYCIN SULFATE 500 MG: 500 TABLET ORAL at 16:48

## 2020-09-07 RX ADMIN — NEOMYCIN SULFATE 500 MG: 500 TABLET ORAL at 22:39

## 2020-09-07 RX ADMIN — SODIUM CHLORIDE, SODIUM LACTATE, POTASSIUM CHLORIDE, AND CALCIUM CHLORIDE 100 ML/HR: .6; .31; .03; .02 INJECTION, SOLUTION INTRAVENOUS at 16:03

## 2020-09-07 RX ADMIN — HEPARIN SODIUM 5000 UNITS: 5000 INJECTION INTRAVENOUS; SUBCUTANEOUS at 22:38

## 2020-09-07 RX ADMIN — LATANOPROST 1 DROP: 50 SOLUTION OPHTHALMIC at 22:38

## 2020-09-07 NOTE — H&P
H&P  - Colorectal Surgery   Adrian Shock 79 y o  male MRN: [id number]  Unit/Bed#: [facility] 801-01 Encounter: [id number]        Assessment:  79y o  year old male who presents for elective left colectomy for carcinoma of the splenic flexure  Plan:  Neomycin/Flagyl 500 x2 today for bowel prep  CLD until midnight  NPO at midnight    OR tomorrow for lap/hand-assist     HPI:  Shakira Jennings is a 79 y o  male with a history of colon cancer with liver mets discovered incidentally when he presented to the ED on 8/19 with a UTI  He underwent colonoscopy which showed a near-obstructing mass of the splenic flexure through which neither an adult nor pediatric scope could be passed  He presents today as a scheduled pre-admit for elective left hemicolectomy  Physical Exam  Vitals signs and nursing note reviewed  Constitutional:       Appearance: He is well-developed  HENT:      Head: Normocephalic and atraumatic  Cardiovascular:      Rate and Rhythm: Normal rate and regular rhythm  Pulmonary:      Effort: Pulmonary effort is normal       Breath sounds: Normal breath sounds  Abdominal:      General: There is no distension  Palpations: Abdomen is soft  Tenderness: There is no abdominal tenderness  There is no guarding  Skin:     General: Skin is warm and dry  Neurological:      Mental Status: He is alert and oriented to person, place, and time  Review of Systems   Constitutional: Positive for appetite change  Negative for chills and fever  HENT: Negative  Eyes: Negative  Negative for discharge  Respiratory: Negative  Negative for chest tightness and shortness of breath  Cardiovascular: Negative  Negative for chest pain  Gastrointestinal: Negative  Negative for abdominal pain, constipation, diarrhea, nausea and vomiting  Endocrine: Negative  Genitourinary: Negative  Negative for flank pain and hematuria  Musculoskeletal: Negative  Negative for arthralgias  Skin: Negative  Negative for rash  Allergic/Immunologic: Negative  Neurological: Negative  Negative for dizziness, weakness and numbness  Hematological: Negative  Psychiatric/Behavioral: Negative  All other systems reviewed and are negative  Objective       No intake or output data in the 24 hours ending 09/07/20 1457    First Vitals:   Blood Pressure: 140/86 (09/07/20 1405)  Temperature: 100 5 °F (38 1 °C) (09/07/20 1405)  Temp Source: Oral (09/07/20 1405)  Respirations: 16 (09/07/20 1405)  Height: 5' 10" (177 8 cm) (09/07/20 1405)  Weight - Scale: 77 1 kg (170 lb) (09/07/20 1405)    Current Vitals:   Blood Pressure: 140/86 (09/07/20 1405)  Temperature: 100 5 °F (38 1 °C) (09/07/20 1405)  Temp Source: Oral (09/07/20 1405)  Respirations: 16 (09/07/20 1405)  Height: 5' 10" (177 8 cm) (09/07/20 1405)  Weight - Scale: 77 1 kg (170 lb) (09/07/20 1405)    Invasive Devices     None                 Imaging: I have personally reviewed pertinent reports  No results found  EKG, Pathology, and Other Studies: I have personally reviewed pertinent reports  VTE Pharmacologic Prophylaxis: Heparin  VTE Mechanical Prophylaxis: sequential compression device    Historical Information   Past Medical History:   Diagnosis Date    Aortic stenosis     BPH with obstruction/lower urinary tract symptoms     Frequency of urination     Glaucoma     Heart murmur     Since birth    Hyperlipidemia     Nocturia     Seasonal allergies     Wears glasses      Past Surgical History:   Procedure Laterality Date    COLONOSCOPY      EGD      INGUINAL HERNIA REPAIR Right     INGUINAL HERNIA REPAIR Left     LUNG BIOPSY      Left lung   Was fine    SD CYSTOURETHRO W/IMPLANT N/A 9/6/2019    Procedure: CYSTOSCOPY WITH INSERTION UROLIFT;  Surgeon: Tiff Zabala MD;  Location: [facility];  Service: Urology    TONSILLECTOMY      WISDOM TOOTH EXTRACTION       Social History   Social History     Substance and Sexual Activity Alcohol Use Yes    Alcohol/week: 1 0 standard drinks    Types: 1 Glasses of wine per week    Drinks per session: 1 or 2    Binge frequency: Daily or almost daily    Comment: 1 glass of wine daily     Social History     Substance and Sexual Activity   Drug Use Never     Social History     Tobacco Use   Smoking Status Former Smoker    Packs/day: 0 50    Years: 4 00    Pack years: 2 00    Types: Cigarettes    Last attempt to quit:     Years since quittin 7   Smokeless Tobacco Never Used     Family History   Problem Relation Age of Onset    Heart attack Mother     Brain cancer Father     Colon cancer Neg Hx        Meds/Allergies   all current active meds have been reviewed, current meds:   No current facility-administered medications for this encounter  Facility-Administered Medications Ordered in Other Encounters   Medication Dose Route Frequency    ceFAZolin (ANCEF) IVPB (premix) 2,000 mg 50 mL  2,000 mg Intravenous Once    And    metroNIDAZOLE (FLAGYL) IVPB (premix) 500 mg 100 mL  500 mg Intravenous Once    lactated ringers infusion  100 mL/hr Intravenous Continuous    and PTA meds:   Prior to Admission Medications   Prescriptions Last Dose Informant Patient Reported? Taking? Multiple Vitamin (MULTIVITAMIN) capsule Not Taking at Unknown time Self Yes No   Sig: Take 1 capsule by mouth daily   ibuprofen (MOTRIN) 200 mg tablet Not Taking at Unknown time  Yes No   Sig: Take 200 mg by mouth every 6 (six) hours as needed for mild pain   latanoprost (XALATAN) 0 005 % ophthalmic solution  Self Yes No   Sig: Administer 1 drop to both eyes daily at bedtime   lovastatin (MEVACOR) 20 mg tablet  Self Yes No   Sig: Take 20 mg by mouth daily at bedtime    timolol (BETIMOL) 0 5 % ophthalmic solution   Yes Yes   Si drop daily in the early morning      Facility-Administered Medications: None     No Known Allergies    Lab Results: I have personally reviewed pertinent lab results    , CBC: No results found for: WBC, HGB, HCT, MCV, PLT, ADJUSTEDWBC, MCH, MCHC, RDW, MPV, NRBC, CMP: No results found for: SODIUM, K, CL, CO2, ANIONGAP, BUN, CREATININE, GLUCOSE, CALCIUM, AST, ALT, ALKPHOS, PROT, BILITOT, EGFR    Counseling / Coordination of Care  Total floor / unit time spent today 25 minutes  Greater than 50% of total time was spent with the patient and / or family counseling and / or coordination of care      Melinda Saenz MD  9/7/2020 2:57 PM

## 2020-09-07 NOTE — PLAN OF CARE
Problem: GASTROINTESTINAL - ADULT  Goal: Minimal or absence of nausea and/or vomiting  Description: INTERVENTIONS:  - Administer IV fluids if ordered to ensure adequate hydration  - Maintain NPO status until nausea and vomiting are resolved  - Nasogastric tube if ordered  - Administer ordered antiemetic medications as needed  - Provide nonpharmacologic comfort measures as appropriate  - Advance diet as tolerated, if ordered  - Consider nutrition services referral to assist patient with adequate nutrition and appropriate food choices  Outcome: Progressing  Goal: Maintains or returns to baseline bowel function  Description: INTERVENTIONS:  - Assess bowel function  - Encourage oral fluids to ensure adequate hydration  - Administer IV fluids if ordered to ensure adequate hydration  - Administer ordered medications as needed  - Encourage mobilization and activity  - Consider nutritional services referral to assist patient with adequate nutrition and appropriate food choices  Outcome: Progressing  Goal: Maintains adequate nutritional intake  Description: INTERVENTIONS:  - Monitor percentage of each meal consumed  - Identify factors contributing to decreased intake, treat as appropriate  - Assist with meals as needed  - Monitor I&O, weight, and lab values if indicated  - Obtain nutrition services referral as needed  Outcome: Progressing     Problem: METABOLIC, FLUID AND ELECTROLYTES - ADULT  Goal: Electrolytes maintained within normal limits  Description: INTERVENTIONS:  - Monitor labs and assess patient for signs and symptoms of electrolyte imbalances  - Administer electrolyte replacement as ordered  - Monitor response to electrolyte replacements, including repeat lab results as appropriate  - Instruct patient on fluid and nutrition as appropriate  Outcome: Progressing     Problem: SKIN/TISSUE INTEGRITY - ADULT  Goal: Incision(s), wounds(s) or drain site(s) healing without S/S of infection  Description: INTERVENTIONS  - Assess and document risk factors for skin impairment   - Assess and document dressing, incision, wound bed, drain sites and surrounding tissue  - Consider nutrition services referral as needed  - Oral mucous membranes remain intact  - Provide patient/ family education  Outcome: Progressing     Problem: PAIN - ADULT  Goal: Verbalizes/displays adequate comfort level or baseline comfort level  Description: Interventions:  - Encourage patient to monitor pain and request assistance  - Assess pain using appropriate pain scale  - Administer analgesics based on type and severity of pain and evaluate response  - Implement non-pharmacological measures as appropriate and evaluate response  - Consider cultural and social influences on pain and pain management  - Notify physician/advanced practitioner if interventions unsuccessful or patient reports new pain  Outcome: Progressing     Problem: INFECTION - ADULT  Goal: Absence or prevention of progression during hospitalization  Description: INTERVENTIONS:  - Assess and monitor for signs and symptoms of infection  - Monitor lab/diagnostic results  - Monitor all insertion sites, i e  indwelling lines, tubes, and drains  - Monitor endotracheal if appropriate and nasal secretions for changes in amount and color  - [place] appropriate cooling/warming therapies per order  - Administer medications as ordered  - Instruct and encourage patient and family to use good hand hygiene technique  - Identify and instruct in appropriate isolation precautions for identified infection/condition  Outcome: Progressing     Problem: SAFETY ADULT  Goal: Patient will remain free of falls  Description: INTERVENTIONS:  - Assess patient frequently for physical needs  -  Identify cognitive and physical deficits and behaviors that affect risk of falls    -  [place] fall precautions as indicated by assessment   - Educate patient/family on patient safety including physical limitations  - Instruct patient to call for assistance with activity based on assessment  - Modify environment to reduce risk of injury  - Consider OT/PT consult to assist with strengthening/mobility  Outcome: Progressing  Goal: Maintain or return to baseline ADL function  Description: INTERVENTIONS:  -  Assess patient's ability to carry out ADLs; assess patient's baseline for ADL function and identify physical deficits which impact ability to perform ADLs (bathing, care of mouth/teeth, toileting, grooming, dressing, etc )  - Assess/evaluate cause of self-care deficits   - Assess range of motion  - Assess patient's mobility; develop plan if impaired  - Assess patient's need for assistive devices and provide as appropriate  - Encourage maximum independence but intervene and supervise when necessary  - Involve family in performance of ADLs  - Assess for home care needs following discharge   - Consider OT consult to assist with ADL evaluation and planning for discharge  - Provide patient education as appropriate  Outcome: Progressing  Goal: Maintain or return mobility status to optimal level  Description: INTERVENTIONS:  - Assess patient's baseline mobility status (ambulation, transfers, stairs, etc )    - Identify cognitive and physical deficits and behaviors that affect mobility  - Identify mobility aids required to assist with transfers and/or ambulation (gait belt, sit-to-stand, lift, walker, cane, etc )  - [place] fall precautions as indicated by assessment  - Record patient progress and toleration of activity level on Mobility SBAR; progress patient to next Phase/Stage  - Instruct patient to call for assistance with activity based on assessment  - Consider rehabilitation consult to assist with strengthening/weightbearing, etc   Outcome: Progressing     Problem: DISCHARGE PLANNING  Goal: Discharge to home or other facility with appropriate resources  Description: INTERVENTIONS:  - Identify barriers to discharge w/patient and caregiver  - Arrange for needed discharge resources and transportation as appropriate  - Identify discharge learning needs (meds, wound care, etc )  - Arrange for interpretive services to assist at discharge as needed  - Refer to Case Management Department for coordinating discharge planning if the patient needs post-hospital services based on physician/advanced practitioner order or complex needs related to functional status, cognitive ability, or social support system  Outcome: Progressing     Problem: Knowledge Deficit  Goal: Patient/family/caregiver demonstrates understanding of disease process, treatment plan, medications, and discharge instructions  Description: Complete learning assessment and assess knowledge base    Interventions:  - Provide teaching at level of understanding  - Provide teaching via preferred learning methods  Outcome: Progressing

## 2020-09-08 ENCOUNTER — ANESTHESIA (INPATIENT)
Dept: PERIOP | Facility: HOSPITAL | Age: 80
DRG: 330 | End: 2020-09-08
Payer: COMMERCIAL

## 2020-09-08 ENCOUNTER — ANESTHESIA EVENT (INPATIENT)
Dept: PERIOP | Facility: HOSPITAL | Age: 80
DRG: 330 | End: 2020-09-08
Payer: COMMERCIAL

## 2020-09-08 VITALS — HEART RATE: 66 BPM

## 2020-09-08 LAB
ABO GROUP BLD: NORMAL
ANION GAP SERPL CALCULATED.3IONS-SCNC: 6 MMOL/L (ref 4–13)
BASOPHILS # BLD AUTO: 0.06 THOUSANDS/ΜL (ref 0–0.1)
BASOPHILS NFR BLD AUTO: 1 % (ref 0–1)
BLD GP AB SCN SERPL QL: NEGATIVE
BLD GP AB SCN SERPL QL: NEGATIVE
BUN SERPL-MCNC: 12 MG/DL (ref 5–25)
CALCIUM SERPL-MCNC: 9 MG/DL (ref 8.3–10.1)
CHLORIDE SERPL-SCNC: 105 MMOL/L (ref 100–108)
CO2 SERPL-SCNC: 26 MMOL/L (ref 21–32)
CREAT SERPL-MCNC: 0.85 MG/DL (ref 0.6–1.3)
EOSINOPHIL # BLD AUTO: 0.28 THOUSAND/ΜL (ref 0–0.61)
EOSINOPHIL NFR BLD AUTO: 3 % (ref 0–6)
ERYTHROCYTE [DISTWIDTH] IN BLOOD BY AUTOMATED COUNT: 12.9 % (ref 11.6–15.1)
GFR SERPL CREATININE-BSD FRML MDRD: 82 ML/MIN/1.73SQ M
GLUCOSE SERPL-MCNC: 108 MG/DL (ref 65–140)
HCT VFR BLD AUTO: 37.6 % (ref 36.5–49.3)
HGB BLD-MCNC: 12.3 G/DL (ref 12–17)
IMM GRANULOCYTES # BLD AUTO: 0.04 THOUSAND/UL (ref 0–0.2)
IMM GRANULOCYTES NFR BLD AUTO: 0 % (ref 0–2)
LYMPHOCYTES # BLD AUTO: 0.92 THOUSANDS/ΜL (ref 0.6–4.47)
LYMPHOCYTES NFR BLD AUTO: 8 % (ref 14–44)
MCH RBC QN AUTO: 28.5 PG (ref 26.8–34.3)
MCHC RBC AUTO-ENTMCNC: 32.7 G/DL (ref 31.4–37.4)
MCV RBC AUTO: 87 FL (ref 82–98)
MONOCYTES # BLD AUTO: 0.77 THOUSAND/ΜL (ref 0.17–1.22)
MONOCYTES NFR BLD AUTO: 7 % (ref 4–12)
NEUTROPHILS # BLD AUTO: 9.33 THOUSANDS/ΜL (ref 1.85–7.62)
NEUTS SEG NFR BLD AUTO: 81 % (ref 43–75)
NRBC BLD AUTO-RTO: 0 /100 WBCS
PLATELET # BLD AUTO: 289 THOUSANDS/UL (ref 149–390)
PMV BLD AUTO: 9.8 FL (ref 8.9–12.7)
POTASSIUM SERPL-SCNC: 4.2 MMOL/L (ref 3.5–5.3)
RBC # BLD AUTO: 4.31 MILLION/UL (ref 3.88–5.62)
RH BLD: NEGATIVE
SODIUM SERPL-SCNC: 137 MMOL/L (ref 136–145)
SPECIMEN EXPIRATION DATE: NORMAL
SPECIMEN EXPIRATION DATE: NORMAL
WBC # BLD AUTO: 11.4 THOUSAND/UL (ref 4.31–10.16)

## 2020-09-08 PROCEDURE — 0DBM0ZZ EXCISION OF DESCENDING COLON, OPEN APPROACH: ICD-10-PCS | Performed by: COLON & RECTAL SURGERY

## 2020-09-08 PROCEDURE — 86900 BLOOD TYPING SEROLOGIC ABO: CPT | Performed by: PHYSICIAN ASSISTANT

## 2020-09-08 PROCEDURE — 86900 BLOOD TYPING SEROLOGIC ABO: CPT | Performed by: NURSE ANESTHETIST, CERTIFIED REGISTERED

## 2020-09-08 PROCEDURE — 47001 NDL BIOPSY LVR TM OTH MAJ PX: CPT | Performed by: PHYSICIAN ASSISTANT

## 2020-09-08 PROCEDURE — 88309 TISSUE EXAM BY PATHOLOGIST: CPT | Performed by: PATHOLOGY

## 2020-09-08 PROCEDURE — 88341 IMHCHEM/IMCYTCHM EA ADD ANTB: CPT | Performed by: PATHOLOGY

## 2020-09-08 PROCEDURE — 88305 TISSUE EXAM BY PATHOLOGIST: CPT | Performed by: PATHOLOGY

## 2020-09-08 PROCEDURE — 44213 LAP MOBIL SPLENIC FL ADD-ON: CPT | Performed by: COLON & RECTAL SURGERY

## 2020-09-08 PROCEDURE — 86850 RBC ANTIBODY SCREEN: CPT | Performed by: PHYSICIAN ASSISTANT

## 2020-09-08 PROCEDURE — 80048 BASIC METABOLIC PNL TOTAL CA: CPT | Performed by: SURGERY

## 2020-09-08 PROCEDURE — 0FB00ZZ EXCISION OF LIVER, OPEN APPROACH: ICD-10-PCS | Performed by: COLON & RECTAL SURGERY

## 2020-09-08 PROCEDURE — 88342 IMHCHEM/IMCYTCHM 1ST ANTB: CPT | Performed by: PATHOLOGY

## 2020-09-08 PROCEDURE — 88307 TISSUE EXAM BY PATHOLOGIST: CPT | Performed by: PATHOLOGY

## 2020-09-08 PROCEDURE — 49255 REMOVAL OF OMENTUM: CPT | Performed by: COLON & RECTAL SURGERY

## 2020-09-08 PROCEDURE — 86901 BLOOD TYPING SEROLOGIC RH(D): CPT | Performed by: NURSE ANESTHETIST, CERTIFIED REGISTERED

## 2020-09-08 PROCEDURE — 88313 SPECIAL STAINS GROUP 2: CPT | Performed by: PATHOLOGY

## 2020-09-08 PROCEDURE — 47001 NDL BIOPSY LVR TM OTH MAJ PX: CPT | Performed by: COLON & RECTAL SURGERY

## 2020-09-08 PROCEDURE — 44204 LAPARO PARTIAL COLECTOMY: CPT | Performed by: COLON & RECTAL SURGERY

## 2020-09-08 PROCEDURE — 85025 COMPLETE CBC W/AUTO DIFF WBC: CPT | Performed by: SURGERY

## 2020-09-08 PROCEDURE — 44204 LAPARO PARTIAL COLECTOMY: CPT | Performed by: PHYSICIAN ASSISTANT

## 2020-09-08 PROCEDURE — 86850 RBC ANTIBODY SCREEN: CPT | Performed by: NURSE ANESTHETIST, CERTIFIED REGISTERED

## 2020-09-08 PROCEDURE — 0DBU0ZZ EXCISION OF OMENTUM, OPEN APPROACH: ICD-10-PCS | Performed by: COLON & RECTAL SURGERY

## 2020-09-08 PROCEDURE — 86901 BLOOD TYPING SEROLOGIC RH(D): CPT | Performed by: PHYSICIAN ASSISTANT

## 2020-09-08 RX ORDER — FENTANYL CITRATE/PF 50 MCG/ML
25 SYRINGE (ML) INJECTION
Status: DISCONTINUED | OUTPATIENT
Start: 2020-09-08 | End: 2020-09-08 | Stop reason: HOSPADM

## 2020-09-08 RX ORDER — DEXTROSE MONOHYDRATE AND SODIUM CHLORIDE 5; .45 G/100ML; G/100ML
125 INJECTION, SOLUTION INTRAVENOUS CONTINUOUS
Status: DISCONTINUED | OUTPATIENT
Start: 2020-09-08 | End: 2020-09-09

## 2020-09-08 RX ORDER — METRONIDAZOLE 500 MG/1
500 TABLET ORAL ONCE
Status: COMPLETED | OUTPATIENT
Start: 2020-09-08 | End: 2020-09-08

## 2020-09-08 RX ORDER — SUCCINYLCHOLINE/SOD CL,ISO/PF 100 MG/5ML
SYRINGE (ML) INTRAVENOUS AS NEEDED
Status: DISCONTINUED | OUTPATIENT
Start: 2020-09-08 | End: 2020-09-08

## 2020-09-08 RX ORDER — LIDOCAINE HYDROCHLORIDE 10 MG/ML
INJECTION, SOLUTION EPIDURAL; INFILTRATION; INTRACAUDAL; PERINEURAL AS NEEDED
Status: DISCONTINUED | OUTPATIENT
Start: 2020-09-08 | End: 2020-09-08

## 2020-09-08 RX ORDER — GLYCOPYRROLATE 0.2 MG/ML
INJECTION INTRAMUSCULAR; INTRAVENOUS AS NEEDED
Status: DISCONTINUED | OUTPATIENT
Start: 2020-09-08 | End: 2020-09-08

## 2020-09-08 RX ORDER — HYDROMORPHONE HCL/PF 1 MG/ML
0.25 SYRINGE (ML) INJECTION
Status: DISCONTINUED | OUTPATIENT
Start: 2020-09-08 | End: 2020-09-08 | Stop reason: HOSPADM

## 2020-09-08 RX ORDER — SODIUM CHLORIDE 9 MG/ML
INJECTION, SOLUTION INTRAVENOUS CONTINUOUS PRN
Status: DISCONTINUED | OUTPATIENT
Start: 2020-09-08 | End: 2020-09-08

## 2020-09-08 RX ORDER — PROPOFOL 10 MG/ML
INJECTION, EMULSION INTRAVENOUS AS NEEDED
Status: DISCONTINUED | OUTPATIENT
Start: 2020-09-08 | End: 2020-09-08

## 2020-09-08 RX ORDER — DEXAMETHASONE SODIUM PHOSPHATE 10 MG/ML
INJECTION, SOLUTION INTRAMUSCULAR; INTRAVENOUS AS NEEDED
Status: DISCONTINUED | OUTPATIENT
Start: 2020-09-08 | End: 2020-09-08

## 2020-09-08 RX ORDER — CEFAZOLIN SODIUM 1 G/50ML
1000 SOLUTION INTRAVENOUS ONCE
Status: COMPLETED | OUTPATIENT
Start: 2020-09-09 | End: 2020-09-09

## 2020-09-08 RX ORDER — MAGNESIUM HYDROXIDE 1200 MG/15ML
LIQUID ORAL AS NEEDED
Status: DISCONTINUED | OUTPATIENT
Start: 2020-09-08 | End: 2020-09-08 | Stop reason: HOSPADM

## 2020-09-08 RX ORDER — LABETALOL 20 MG/4 ML (5 MG/ML) INTRAVENOUS SYRINGE
AS NEEDED
Status: DISCONTINUED | OUTPATIENT
Start: 2020-09-08 | End: 2020-09-08

## 2020-09-08 RX ORDER — CEFAZOLIN SODIUM 1 G/3ML
INJECTION, POWDER, FOR SOLUTION INTRAMUSCULAR; INTRAVENOUS AS NEEDED
Status: DISCONTINUED | OUTPATIENT
Start: 2020-09-08 | End: 2020-09-08

## 2020-09-08 RX ORDER — ONDANSETRON 2 MG/ML
INJECTION INTRAMUSCULAR; INTRAVENOUS AS NEEDED
Status: DISCONTINUED | OUTPATIENT
Start: 2020-09-08 | End: 2020-09-08

## 2020-09-08 RX ORDER — FENTANYL CITRATE 50 UG/ML
INJECTION, SOLUTION INTRAMUSCULAR; INTRAVENOUS AS NEEDED
Status: DISCONTINUED | OUTPATIENT
Start: 2020-09-08 | End: 2020-09-08

## 2020-09-08 RX ORDER — CEFAZOLIN SODIUM 1 G/50ML
1000 SOLUTION INTRAVENOUS ONCE
Status: DISCONTINUED | OUTPATIENT
Start: 2020-09-09 | End: 2020-09-08

## 2020-09-08 RX ORDER — ONDANSETRON 2 MG/ML
4 INJECTION INTRAMUSCULAR; INTRAVENOUS ONCE AS NEEDED
Status: DISCONTINUED | OUTPATIENT
Start: 2020-09-08 | End: 2020-09-08 | Stop reason: HOSPADM

## 2020-09-08 RX ORDER — ROCURONIUM BROMIDE 10 MG/ML
INJECTION, SOLUTION INTRAVENOUS AS NEEDED
Status: DISCONTINUED | OUTPATIENT
Start: 2020-09-08 | End: 2020-09-08

## 2020-09-08 RX ORDER — HEPARIN SODIUM 5000 [USP'U]/ML
5000 INJECTION, SOLUTION INTRAVENOUS; SUBCUTANEOUS EVERY 8 HOURS SCHEDULED
Status: DISCONTINUED | OUTPATIENT
Start: 2020-09-09 | End: 2020-09-14 | Stop reason: HOSPADM

## 2020-09-08 RX ORDER — HEPARIN SODIUM 5000 [USP'U]/ML
INJECTION, SOLUTION INTRAVENOUS; SUBCUTANEOUS AS NEEDED
Status: DISCONTINUED | OUTPATIENT
Start: 2020-09-08 | End: 2020-09-08

## 2020-09-08 RX ORDER — NEOSTIGMINE METHYLSULFATE 1 MG/ML
INJECTION INTRAVENOUS AS NEEDED
Status: DISCONTINUED | OUTPATIENT
Start: 2020-09-08 | End: 2020-09-08

## 2020-09-08 RX ORDER — CEFAZOLIN SODIUM 2 G/50ML
2000 SOLUTION INTRAVENOUS
Status: DISCONTINUED | OUTPATIENT
Start: 2020-09-08 | End: 2020-09-08 | Stop reason: HOSPADM

## 2020-09-08 RX ORDER — HYDROMORPHONE HCL 110MG/55ML
PATIENT CONTROLLED ANALGESIA SYRINGE INTRAVENOUS AS NEEDED
Status: DISCONTINUED | OUTPATIENT
Start: 2020-09-08 | End: 2020-09-08

## 2020-09-08 RX ORDER — NEOMYCIN SULFATE 500 MG/1
500 TABLET ORAL ONCE
Status: COMPLETED | OUTPATIENT
Start: 2020-09-08 | End: 2020-09-08

## 2020-09-08 RX ADMIN — PHENYLEPHRINE HYDROCHLORIDE 200 MCG: 10 INJECTION INTRAVENOUS at 18:45

## 2020-09-08 RX ADMIN — Medication: at 20:30

## 2020-09-08 RX ADMIN — PHENYLEPHRINE HYDROCHLORIDE 100 MCG: 10 INJECTION INTRAVENOUS at 17:31

## 2020-09-08 RX ADMIN — DEXTROSE AND SODIUM CHLORIDE 125 ML/HR: 5; .45 INJECTION, SOLUTION INTRAVENOUS at 21:36

## 2020-09-08 RX ADMIN — FENTANYL CITRATE 50 MCG: 50 INJECTION, SOLUTION INTRAMUSCULAR; INTRAVENOUS at 17:42

## 2020-09-08 RX ADMIN — FENTANYL CITRATE 50 MCG: 50 INJECTION, SOLUTION INTRAMUSCULAR; INTRAVENOUS at 19:11

## 2020-09-08 RX ADMIN — METRONIDAZOLE 500 MG: 500 TABLET, FILM COATED ORAL at 11:55

## 2020-09-08 RX ADMIN — PHENYLEPHRINE HYDROCHLORIDE 100 MCG: 10 INJECTION INTRAVENOUS at 18:33

## 2020-09-08 RX ADMIN — PROPOFOL 160 MG: 10 INJECTION, EMULSION INTRAVENOUS at 17:31

## 2020-09-08 RX ADMIN — Medication 500 MG: at 17:50

## 2020-09-08 RX ADMIN — LABETALOL 20 MG/4 ML (5 MG/ML) INTRAVENOUS SYRINGE 10 MG: at 19:54

## 2020-09-08 RX ADMIN — FENTANYL CITRATE 50 MCG: 50 INJECTION, SOLUTION INTRAMUSCULAR; INTRAVENOUS at 17:31

## 2020-09-08 RX ADMIN — HYDROMORPHONE HYDROCHLORIDE 0.5 MG: 2 INJECTION, SOLUTION INTRAMUSCULAR; INTRAVENOUS; SUBCUTANEOUS at 19:54

## 2020-09-08 RX ADMIN — GLYCOPYRROLATE 0.6 MG: 0.2 INJECTION, SOLUTION INTRAMUSCULAR; INTRAVENOUS at 19:42

## 2020-09-08 RX ADMIN — FENTANYL CITRATE 25 MCG: 50 INJECTION, SOLUTION INTRAMUSCULAR; INTRAVENOUS at 19:47

## 2020-09-08 RX ADMIN — SODIUM CHLORIDE: 0.9 INJECTION, SOLUTION INTRAVENOUS at 17:50

## 2020-09-08 RX ADMIN — CEFAZOLIN 2000 MG: 1 INJECTION, POWDER, FOR SOLUTION INTRAVENOUS at 17:35

## 2020-09-08 RX ADMIN — METRONIDAZOLE 500 MG: 500 INJECTION, SOLUTION INTRAVENOUS at 21:46

## 2020-09-08 RX ADMIN — HEPARIN SODIUM 5000 UNITS: 5000 INJECTION INTRAVENOUS; SUBCUTANEOUS at 13:21

## 2020-09-08 RX ADMIN — HYDROMORPHONE HYDROCHLORIDE 0.5 MG: 2 INJECTION, SOLUTION INTRAMUSCULAR; INTRAVENOUS; SUBCUTANEOUS at 19:47

## 2020-09-08 RX ADMIN — LABETALOL 20 MG/4 ML (5 MG/ML) INTRAVENOUS SYRINGE 10 MG: at 19:47

## 2020-09-08 RX ADMIN — ONDANSETRON 4 MG: 2 INJECTION INTRAMUSCULAR; INTRAVENOUS at 19:36

## 2020-09-08 RX ADMIN — POLYETHYLENE GLYCOL 3350, SODIUM SULFATE ANHYDROUS, SODIUM BICARBONATE, SODIUM CHLORIDE, POTASSIUM CHLORIDE 4000 ML: 236; 22.74; 6.74; 5.86; 2.97 POWDER, FOR SOLUTION ORAL at 11:55

## 2020-09-08 RX ADMIN — PHENYLEPHRINE HYDROCHLORIDE 30 MCG/MIN: 10 INJECTION INTRAVENOUS at 18:45

## 2020-09-08 RX ADMIN — TIMOLOL MALEATE 1 DROP: 2.5 SOLUTION/ DROPS OPHTHALMIC at 08:45

## 2020-09-08 RX ADMIN — HEPARIN SODIUM 5000 UNITS: 5000 INJECTION INTRAVENOUS; SUBCUTANEOUS at 18:00

## 2020-09-08 RX ADMIN — NEOSTIGMINE METHYLSULFATE 4 MG: 1 INJECTION, SOLUTION INTRAVENOUS at 19:42

## 2020-09-08 RX ADMIN — ROCURONIUM BROMIDE 50 MG: 10 INJECTION, SOLUTION INTRAVENOUS at 17:50

## 2020-09-08 RX ADMIN — PHENYLEPHRINE HYDROCHLORIDE 200 MCG: 10 INJECTION INTRAVENOUS at 18:41

## 2020-09-08 RX ADMIN — ROCURONIUM BROMIDE 10 MG: 10 INJECTION, SOLUTION INTRAVENOUS at 18:55

## 2020-09-08 RX ADMIN — Medication 100 MG: at 17:31

## 2020-09-08 RX ADMIN — LIDOCAINE HYDROCHLORIDE 50 MG: 10 INJECTION, SOLUTION EPIDURAL; INFILTRATION; INTRACAUDAL; PERINEURAL at 17:31

## 2020-09-08 RX ADMIN — PHENYLEPHRINE HYDROCHLORIDE 200 MCG: 10 INJECTION INTRAVENOUS at 18:39

## 2020-09-08 RX ADMIN — HEPARIN SODIUM 5000 UNITS: 5000 INJECTION INTRAVENOUS; SUBCUTANEOUS at 05:23

## 2020-09-08 RX ADMIN — NEOMYCIN SULFATE 500 MG: 500 TABLET ORAL at 13:21

## 2020-09-08 RX ADMIN — DEXAMETHASONE SODIUM PHOSPHATE 10 MG: 10 INJECTION, SOLUTION INTRAMUSCULAR; INTRAVENOUS at 19:28

## 2020-09-08 RX ADMIN — SODIUM CHLORIDE, SODIUM LACTATE, POTASSIUM CHLORIDE, AND CALCIUM CHLORIDE 100 ML/HR: .6; .31; .03; .02 INJECTION, SOLUTION INTRAVENOUS at 00:51

## 2020-09-08 RX ADMIN — SODIUM CHLORIDE, SODIUM LACTATE, POTASSIUM CHLORIDE, AND CALCIUM CHLORIDE: .6; .31; .03; .02 INJECTION, SOLUTION INTRAVENOUS at 18:33

## 2020-09-08 RX ADMIN — FENTANYL CITRATE 25 MCG: 50 INJECTION, SOLUTION INTRAMUSCULAR; INTRAVENOUS at 19:31

## 2020-09-08 RX ADMIN — ROCURONIUM BROMIDE 10 MG: 10 INJECTION, SOLUTION INTRAVENOUS at 18:32

## 2020-09-08 NOTE — UTILIZATION REVIEW
Initial Clinical Review    Admission: Date/Time/Statement:   Admission Orders (From admission, onward)     Ordered        09/07/20 1504  Inpatient Admission  Once                   Orders Placed This Encounter   Procedures    Inpatient Admission     Standing Status:   Standing     Number of Occurrences:   1     Order Specific Question:   Admitting Physician     Answer:   Elder Loleta     Order Specific Question:   Level of Care     Answer:   Med Surg [16]     Order Specific Question:   Estimated length of stay     Answer:   More than 2 Midnights     Order Specific Question:   Certification     Answer:   I certify that inpatient services are medically necessary for this patient for a duration of greater than two midnights  See H&P and MD Progress Notes for additional information about the patient's course of treatment  Assessment/Plan:   Mr Esthela Dominguez is an 78 yo elective admission for pre-op preparation for elective L Hemicolectomy for CA of splenic flexure  The diagnosis was found incidentially when he came to the ED on 8/19 for treatment of UTI  He had colonoscopy showing near-obstructing mass at splenic flexure which no scope - adult or pediatric - could pass through  He will go to the OR 9/8  NPO at [place] and IV fluids       +++++++++++++++++++++++++++++++  9/8 Operative Report      +++++++++++++++++++++++++++++++       Pain Score       09/07/20 1357       No Pain          Wt Readings from Last 1 Encounters:   09/07/20 77 1 kg (170 lb)     Additional Vital Signs:   09/08/20 07:31:23   98 4 °F (36 9 °C)   74   18   128/72   91   95 %       09/07/20 23:34:59   99 6 °F (37 6 °C)   86   16   133/82   99   97 %       09/07/20 21:17:44      82            96 %       09/07/20 1951                     None (Room air)    09/07/20 14:05:07   100 5 °F (38 1 °C)   85   16   140/86   104            Pertinent Labs/Diagnostic Test Results:     8/19 CT abd, pelvis - 1    Circumferential 4 cm lesion constricting the proximal descending colon just distal to the splenic flexure, concerning for malignancy  Significant amount of stool in the proximal colon may indicate chronic low-grade /partial obstruction and/or constipation  Recommend surgical consultation  2   Numerous lesions throughout the liver measuring up to 4 6 cm, most consistent with metastatic disease  3   Enlarged prostate  No obstructive uropathy or pyelonephritis        Results from last 7 days   Lab Units 09/08/20  0457   WBC Thousand/uL 11 40*   HEMOGLOBIN g/dL 12 3   HEMATOCRIT % 37 6   PLATELETS Thousands/uL 289   NEUTROS ABS Thousands/µL 9 33*         Results from last 7 days   Lab Units 09/08/20  0457   SODIUM mmol/L 137   POTASSIUM mmol/L 4 2   CHLORIDE mmol/L 105   CO2 mmol/L 26   ANION GAP mmol/L 6   BUN mg/dL 12   CREATININE mg/dL 0 85   EGFR ml/min/1 73sq m 82   CALCIUM mg/dL 9 0     Results from last 7 days   Lab Units 09/08/20  0457   GLUCOSE RANDOM mg/dL 108       Past Medical History:   Diagnosis Date    Aortic stenosis     BPH with obstruction/lower urinary tract symptoms     Frequency of urination     Glaucoma     Heart murmur     Since birth    Hyperlipidemia     Nocturia     Seasonal allergies     Wears glasses      Present on Admission:   Colonic mass    Admitting Diagnosis: 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]     Age/Sex: 79 y o  male     Admission Orders:    Scheduled Medications:  cefazolin, 2,000 mg, Intravenous, On Call To OR  heparin (porcine), 5,000 Units, Subcutaneous, Q8H [facility]  latanoprost, 1 drop, Both Eyes, HS  metroNIDAZOLE, 500 mg, Intravenous, On Call To OR  pravastatin, 20 mg, Oral, Daily With Dinner  timolol, 1 drop, Both Eyes, Daily      Continuous IV Infusions:  lactated ringers, 100 mL/hr, Intravenous, Continuous      PRN Meds:  ondansetron, 4 mg, Intravenous, Q6H PRN    SCDs  NPO p MN  IV Fluids  Up as tolerated   Wound Care Consult for stoma marking  IP CONSULT TO CASE MANAGEMENT    Network Utilization Review Department  [email or web address]  ATTENTION: Please call with any questions or concerns to [phone] and carefully listen to the prompts so that you are directed to the right person  All voicemails are confidential   Lynette Kussmaul all requests for admission clinical reviews, approved or denied determinations and any other requests to dedicated fax number below belonging to the campus where the patient is receiving treatment   List of dedicated fax numbers for the Facilities:  [address] DENIALS (Administrative/Medical Necessity) [phone]   [address] (Maternity/NICU/Pediatrics) [phone]   Darnell Stephanie [phone]     [address] [phone]   [facility] [phone]   Eliverto Meals [phone]   [address] [place] [address] [phone]   [facility]  [phone]   [address] [place] [address], [address]  2401 [facility] [address] [facility] [place] [phone]

## 2020-09-08 NOTE — ANESTHESIA PROCEDURE NOTES
Arterial Line Insertion  Performed by: Damaris Franz CRNA  Authorized by: Mauricio Pool MD   Consent: Verbal consent obtained  Risks and benefits: risks, benefits and alternatives were discussed  Consent given by: patient  Patient identity confirmed: verbally with patient and arm band  Preparation: Patient was prepped and draped in the usual sterile fashion    Indications: hemodynamic monitoring  Orientation:  Left  Location: radial artery  Procedure Details:  Taurus's test normal: yes  Needle gauge: 20  Number of attempts: 1    Post-procedure:  Post-procedure: dressing applied  Patient tolerance: Patient tolerated the procedure well with no immediate complications

## 2020-09-08 NOTE — PROGRESS NOTES
Progress Note - Colorectal   Juan F Krishnamurthy 79 y o  male MRN: [id number]  Unit/Bed#: [facility] 801-01 Encounter: [id number]      Objective:  Patient doing well this morning  Patient tolerated clear liquids all day yesterday  Patient has taken his neomycin oral with the vaginal was ordered IV  Patient states he has not there is no nausea, no emesis  No bowel prep was ordered  Patient does have Ringer's lactate running at 100 mL/hour  Type and screen was done this morning for OR today  600 + 1 UA  1 BM      Blood pressure 133/82, pulse 86, temperature 99 6 °F (37 6 °C), resp  rate 16, height 5' 10" (1 778 m), weight 77 1 kg (170 lb), SpO2 97 %  ,Body mass index is 24 39 kg/m²  Intake/Output Summary (Last 24 hours) at 9/8/2020 0522  Last data filed at 9/8/2020 0502  Gross per 24 hour   Intake 1246 66 ml   Output 600 ml   Net 646 66 ml       Invasive Devices     Peripheral Intravenous Line            Peripheral IV 09/07/20 Left Antecubital less than 1 day                Physical Exam:   Abdomen:  Soft, nondistended, nontender, active bowel sounds  Extremities:  No edema, no calf tenderness    Lab, Imaging and other studies:  Pending  VTE Pharmacologic Prophylaxis: Heparin  VTE Mechanical Prophylaxis: sequential compression device    Assessment:  Colon cancer  For OR today    Plan:  1  Ordering type and screen  2  Ordering antibiotics for on call the or  3  Continue the subcu heparin  4  Continue IV fluids at 100 and keep patient NPO  5  May shower  6  Check a m   Labs

## 2020-09-08 NOTE — PLAN OF CARE
Problem: GASTROINTESTINAL - ADULT  Goal: Minimal or absence of nausea and/or vomiting  Description: INTERVENTIONS:  - Administer IV fluids if ordered to ensure adequate hydration  - Maintain NPO status until nausea and vomiting are resolved  - Nasogastric tube if ordered  - Administer ordered antiemetic medications as needed  - Provide nonpharmacologic comfort measures as appropriate  - Advance diet as tolerated, if ordered  - Consider nutrition services referral to assist patient with adequate nutrition and appropriate food choices  Outcome: Progressing  Goal: Maintains or returns to baseline bowel function  Description: INTERVENTIONS:  - Assess bowel function  - Encourage oral fluids to ensure adequate hydration  - Administer IV fluids if ordered to ensure adequate hydration  - Administer ordered medications as needed  - Encourage mobilization and activity  - Consider nutritional services referral to assist patient with adequate nutrition and appropriate food choices  Outcome: Progressing  Goal: Maintains adequate nutritional intake  Description: INTERVENTIONS:  - Monitor percentage of each meal consumed  - Identify factors contributing to decreased intake, treat as appropriate  - Assist with meals as needed  - Monitor I&O, weight, and lab values if indicated  - Obtain nutrition services referral as needed  Outcome: Progressing     Problem: METABOLIC, FLUID AND ELECTROLYTES - ADULT  Goal: Electrolytes maintained within normal limits  Description: INTERVENTIONS:  - Monitor labs and assess patient for signs and symptoms of electrolyte imbalances  - Administer electrolyte replacement as ordered  - Monitor response to electrolyte replacements, including repeat lab results as appropriate  - Instruct patient on fluid and nutrition as appropriate  Outcome: Progressing     Problem: SKIN/TISSUE INTEGRITY - ADULT  Goal: Incision(s), wounds(s) or drain site(s) healing without S/S of infection  Description: INTERVENTIONS  - Assess and document risk factors for skin impairment   - Assess and document dressing, incision, wound bed, drain sites and surrounding tissue  - Consider nutrition services referral as needed  - Oral mucous membranes remain intact  - Provide patient/ family education  Outcome: Progressing     Problem: PAIN - ADULT  Goal: Verbalizes/displays adequate comfort level or baseline comfort level  Description: Interventions:  - Encourage patient to monitor pain and request assistance  - Assess pain using appropriate pain scale  - Administer analgesics based on type and severity of pain and evaluate response  - Implement non-pharmacological measures as appropriate and evaluate response  - Consider cultural and social influences on pain and pain management  - Notify physician/advanced practitioner if interventions unsuccessful or patient reports new pain  Outcome: Progressing     Problem: INFECTION - ADULT  Goal: Absence or prevention of progression during hospitalization  Description: INTERVENTIONS:  - Assess and monitor for signs and symptoms of infection  - Monitor lab/diagnostic results  - Monitor all insertion sites, i e  indwelling lines, tubes, and drains  - Monitor endotracheal if appropriate and nasal secretions for changes in amount and color  - [place] appropriate cooling/warming therapies per order  - Administer medications as ordered  - Instruct and encourage patient and family to use good hand hygiene technique  - Identify and instruct in appropriate isolation precautions for identified infection/condition  Outcome: Progressing     Problem: SAFETY ADULT  Goal: Patient will remain free of falls  Description: INTERVENTIONS:  - Assess patient frequently for physical needs  -  Identify cognitive and physical deficits and behaviors that affect risk of falls    -  [place] fall precautions as indicated by assessment   - Educate patient/family on patient safety including physical limitations  - Instruct patient to call for assistance with activity based on assessment  - Modify environment to reduce risk of injury  - Consider OT/PT consult to assist with strengthening/mobility  Outcome: Progressing  Goal: Maintain or return to baseline ADL function  Description: INTERVENTIONS:  -  Assess patient's ability to carry out ADLs; assess patient's baseline for ADL function and identify physical deficits which impact ability to perform ADLs (bathing, care of mouth/teeth, toileting, grooming, dressing, etc )  - Assess/evaluate cause of self-care deficits   - Assess range of motion  - Assess patient's mobility; develop plan if impaired  - Assess patient's need for assistive devices and provide as appropriate  - Encourage maximum independence but intervene and supervise when necessary  - Involve family in performance of ADLs  - Assess for home care needs following discharge   - Consider OT consult to assist with ADL evaluation and planning for discharge  - Provide patient education as appropriate  Outcome: Progressing  Goal: Maintain or return mobility status to optimal level  Description: INTERVENTIONS:  - Assess patient's baseline mobility status (ambulation, transfers, stairs, etc )    - Identify cognitive and physical deficits and behaviors that affect mobility  - Identify mobility aids required to assist with transfers and/or ambulation (gait belt, sit-to-stand, lift, walker, cane, etc )  - [place] fall precautions as indicated by assessment  - Record patient progress and toleration of activity level on Mobility SBAR; progress patient to next Phase/Stage  - Instruct patient to call for assistance with activity based on assessment  - Consider rehabilitation consult to assist with strengthening/weightbearing, etc   Outcome: Progressing     Problem: DISCHARGE PLANNING  Goal: Discharge to home or other facility with appropriate resources  Description: INTERVENTIONS:  - Identify barriers to discharge w/patient and caregiver  - Arrange for needed discharge resources and transportation as appropriate  - Identify discharge learning needs (meds, wound care, etc )  - Arrange for interpretive services to assist at discharge as needed  - Refer to Case Management Department for coordinating discharge planning if the patient needs post-hospital services based on physician/advanced practitioner order or complex needs related to functional status, cognitive ability, or social support system  Outcome: Progressing     Problem: Knowledge Deficit  Goal: Patient/family/caregiver demonstrates understanding of disease process, treatment plan, medications, and discharge instructions  Description: Complete learning assessment and assess knowledge base  Interventions:  - Provide teaching at level of understanding  - Provide teaching via preferred learning methods  Outcome: Progressing     Problem: Potential for Falls  Goal: Patient will remain free of falls  Description: INTERVENTIONS:  - Assess patient frequently for physical needs  -  Identify cognitive and physical deficits and behaviors that affect risk of falls    -  [place] fall precautions as indicated by assessment   - Educate patient/family on patient safety including physical limitations  - Instruct patient to call for assistance with activity based on assessment  - Modify environment to reduce risk of injury  - Consider OT/PT consult to assist with strengthening/mobility  Outcome: Progressing

## 2020-09-08 NOTE — ANESTHESIA PREPROCEDURE EVALUATION
Procedure:  COLECTOMY, SUBTOTAL LAPAROSCOPIC (Left Abdomen)    Relevant Problems   CARDIO   (+) Hyperlipidemia      GI/HEPATIC   (+) Cancer of descending colon (HCC)   (+) Cancer of splenic flexure (HCC)   (+) Metastases to the liver (HCC)      /RENAL   (+) 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 urinary tract symptoms      Other   (+) Glaucoma   (+) Seasonal allergies      3/16/18:    SUMMARY     LEFT VENTRICLE:  Systolic function was normal  Ejection fraction was estimated in the range of 55 % to 60 %  There were no regional wall motion abnormalities  Wall thickness was at the upper limits of normal      LEFT ATRIUM:  The atrium was mildly dilated      MITRAL VALVE:  There was mild regurgitation      AORTIC VALVE:  The valve was trileaflet  Leaflets exhibited moderately increased thickness  There was mild stenosis  Peak velocity across valve of 2 1 M/S  LINNETTE est  at 1 5 cm2 probably is underestimate of LINNETTE     TRICUSPID VALVE:  There was mild regurgitation      PULMONIC VALVE:  There was trace regurgitation      Physical Exam    Airway    Mallampati score: III  TM Distance: >3 FB  Neck ROM: full     Dental   No notable dental hx     Cardiovascular  Rhythm: regular, Rate: normal, Cardiovascular exam normal    Pulmonary  Pulmonary exam normal     Other Findings        Anesthesia Plan  ASA Score- 2     Anesthesia Type- general with ASA Monitors  Additional Monitors:   Airway Plan: ETT  Plan Factors-Exercise tolerance (METS): >4 METS  Chart reviewed  EKG reviewed  Existing labs reviewed  Patient summary reviewed  Patient is not a current smoker  Patient did not smoke on day of surgery  Induction- intravenous and rapid sequence induction  Postoperative Plan- Plan for postoperative opioid use  Planned trial extubation    Informed Consent- Anesthetic plan and risks discussed with patient  I personally reviewed this patient with the CRNA   Discussed and agreed on the Anesthesia Plan with the CATE Pepe

## 2020-09-08 NOTE — WOUND OSTOMY CARE
Progress Note- Ostomy  Alin Maguire 79 y o  male  [id number]  [facility] 801-01        Assessment:  Patient seen this morning for stoma site marking, he is awake, alert in bed with daughter at bed side  Patient is well informed of planned surgery for today, patient is aware wound care nurse to tom for Ileostomy or colostomy, surgeon to decide type of stoma once surgery is initiated  Patient's abdomen was assessed in three different position, supine, sitting and standing, no scars or skin integrity issues seen, abdominus rectus identified via palpation while coughing  Patient given three different sites, two for colostomy and one site for Ileostomy, patient agreeable for specific teaching to be initiated once we know type of stoma placed  Patient made aware wound care nurse will continue following with teaching post operatively, all questions answered  Plan:  [address] [place] will follow post op for continued education  Vitals:    09/08/20 1522   BP: 127/73   Pulse:    Resp: 18   Temp: 98 2 °F (36 8 °C)   SpO2:            PA made aware patient marked, please call wound care with questions and concerns        Tiffanie Blackwood RN, BSN, [facility]

## 2020-09-09 LAB
ANION GAP SERPL CALCULATED.3IONS-SCNC: 8 MMOL/L (ref 4–13)
BASOPHILS # BLD AUTO: 0.01 THOUSANDS/ΜL (ref 0–0.1)
BASOPHILS NFR BLD AUTO: 0 % (ref 0–1)
BUN SERPL-MCNC: 9 MG/DL (ref 5–25)
CALCIUM SERPL-MCNC: 8.5 MG/DL (ref 8.3–10.1)
CHLORIDE SERPL-SCNC: 106 MMOL/L (ref 100–108)
CO2 SERPL-SCNC: 25 MMOL/L (ref 21–32)
CREAT SERPL-MCNC: 0.84 MG/DL (ref 0.6–1.3)
EOSINOPHIL # BLD AUTO: 0 THOUSAND/ΜL (ref 0–0.61)
EOSINOPHIL NFR BLD AUTO: 0 % (ref 0–6)
ERYTHROCYTE [DISTWIDTH] IN BLOOD BY AUTOMATED COUNT: 12.8 % (ref 11.6–15.1)
GFR SERPL CREATININE-BSD FRML MDRD: 82 ML/MIN/1.73SQ M
GLUCOSE SERPL-MCNC: 217 MG/DL (ref 65–140)
HCT VFR BLD AUTO: 38.1 % (ref 36.5–49.3)
HGB BLD-MCNC: 12.1 G/DL (ref 12–17)
IMM GRANULOCYTES # BLD AUTO: 0.04 THOUSAND/UL (ref 0–0.2)
IMM GRANULOCYTES NFR BLD AUTO: 0 % (ref 0–2)
LYMPHOCYTES # BLD AUTO: 0.2 THOUSANDS/ΜL (ref 0.6–4.47)
LYMPHOCYTES NFR BLD AUTO: 2 % (ref 14–44)
MAGNESIUM SERPL-MCNC: 2.2 MG/DL (ref 1.6–2.6)
MCH RBC QN AUTO: 28.3 PG (ref 26.8–34.3)
MCHC RBC AUTO-ENTMCNC: 31.8 G/DL (ref 31.4–37.4)
MCV RBC AUTO: 89 FL (ref 82–98)
MONOCYTES # BLD AUTO: 0.43 THOUSAND/ΜL (ref 0.17–1.22)
MONOCYTES NFR BLD AUTO: 4 % (ref 4–12)
NEUTROPHILS # BLD AUTO: 9.3 THOUSANDS/ΜL (ref 1.85–7.62)
NEUTS SEG NFR BLD AUTO: 94 % (ref 43–75)
NRBC BLD AUTO-RTO: 0 /100 WBCS
PHOSPHATE SERPL-MCNC: 3.7 MG/DL (ref 2.3–4.1)
PLATELET # BLD AUTO: 277 THOUSANDS/UL (ref 149–390)
PMV BLD AUTO: 10.3 FL (ref 8.9–12.7)
POTASSIUM SERPL-SCNC: 4.6 MMOL/L (ref 3.5–5.3)
RBC # BLD AUTO: 4.28 MILLION/UL (ref 3.88–5.62)
SODIUM SERPL-SCNC: 139 MMOL/L (ref 136–145)
WBC # BLD AUTO: 9.98 THOUSAND/UL (ref 4.31–10.16)

## 2020-09-09 PROCEDURE — 85025 COMPLETE CBC W/AUTO DIFF WBC: CPT | Performed by: SURGERY

## 2020-09-09 PROCEDURE — 97163 PT EVAL HIGH COMPLEX 45 MIN: CPT

## 2020-09-09 PROCEDURE — 97167 OT EVAL HIGH COMPLEX 60 MIN: CPT

## 2020-09-09 PROCEDURE — 80048 BASIC METABOLIC PNL TOTAL CA: CPT | Performed by: SURGERY

## 2020-09-09 PROCEDURE — 84100 ASSAY OF PHOSPHORUS: CPT | Performed by: SURGERY

## 2020-09-09 PROCEDURE — 83735 ASSAY OF MAGNESIUM: CPT | Performed by: SURGERY

## 2020-09-09 RX ORDER — DEXTROSE, SODIUM CHLORIDE, AND POTASSIUM CHLORIDE 5; .45; .15 G/100ML; G/100ML; G/100ML
75 INJECTION INTRAVENOUS CONTINUOUS
Status: DISCONTINUED | OUTPATIENT
Start: 2020-09-09 | End: 2020-09-13

## 2020-09-09 RX ORDER — MAGNESIUM HYDROXIDE/ALUMINUM HYDROXICE/SIMETHICONE 120; 1200; 1200 MG/30ML; MG/30ML; MG/30ML
30 SUSPENSION ORAL EVERY 4 HOURS PRN
Status: DISCONTINUED | OUTPATIENT
Start: 2020-09-09 | End: 2020-09-14 | Stop reason: HOSPADM

## 2020-09-09 RX ADMIN — CEFAZOLIN SODIUM 1000 MG: 1 SOLUTION INTRAVENOUS at 01:22

## 2020-09-09 RX ADMIN — HEPARIN SODIUM 5000 UNITS: 5000 INJECTION INTRAVENOUS; SUBCUTANEOUS at 22:21

## 2020-09-09 RX ADMIN — HEPARIN SODIUM 5000 UNITS: 5000 INJECTION INTRAVENOUS; SUBCUTANEOUS at 15:15

## 2020-09-09 RX ADMIN — TIMOLOL MALEATE 1 DROP: 2.5 SOLUTION/ DROPS OPHTHALMIC at 09:12

## 2020-09-09 RX ADMIN — LATANOPROST 1 DROP: 50 SOLUTION OPHTHALMIC at 22:21

## 2020-09-09 RX ADMIN — ONDANSETRON 4 MG: 2 INJECTION INTRAMUSCULAR; INTRAVENOUS at 17:36

## 2020-09-09 RX ADMIN — DEXTROSE, SODIUM CHLORIDE, AND POTASSIUM CHLORIDE 84 ML/HR: 5; .45; .15 INJECTION INTRAVENOUS at 05:23

## 2020-09-09 RX ADMIN — HEPARIN SODIUM 5000 UNITS: 5000 INJECTION INTRAVENOUS; SUBCUTANEOUS at 05:18

## 2020-09-09 RX ADMIN — DEXTROSE, SODIUM CHLORIDE, AND POTASSIUM CHLORIDE 50 ML/HR: 5; .45; .15 INJECTION INTRAVENOUS at 22:22

## 2020-09-09 NOTE — PLAN OF CARE
Problem: GASTROINTESTINAL - ADULT  Goal: Minimal or absence of nausea and/or vomiting  Description: INTERVENTIONS:  - Administer IV fluids if ordered to ensure adequate hydration  - Maintain NPO status until nausea and vomiting are resolved  - Nasogastric tube if ordered  - Administer ordered antiemetic medications as needed  - Provide nonpharmacologic comfort measures as appropriate  - Advance diet as tolerated, if ordered  - Consider nutrition services referral to assist patient with adequate nutrition and appropriate food choices  9/9/2020 0734 by Scout Castro RN  Outcome: Progressing  9/9/2020 0733 by Scout Castro RN  Outcome: Progressing  Goal: Maintains or returns to baseline bowel function  Description: INTERVENTIONS:  - Assess bowel function  - Encourage oral fluids to ensure adequate hydration  - Administer IV fluids if ordered to ensure adequate hydration  - Administer ordered medications as needed  - Encourage mobilization and activity  - Consider nutritional services referral to assist patient with adequate nutrition and appropriate food choices  9/9/2020 0734 by Scout Castro RN  Outcome: Progressing  9/9/2020 0733 by Scout Castro RN  Outcome: Progressing  Goal: Maintains adequate nutritional intake  Description: INTERVENTIONS:  - Monitor percentage of each meal consumed  - Identify factors contributing to decreased intake, treat as appropriate  - Assist with meals as needed  - Monitor I&O, weight, and lab values if indicated  - Obtain nutrition services referral as needed  9/9/2020 0734 by Scout Castro RN  Outcome: Progressing  9/9/2020 0733 by Scout Castro RN  Outcome: Progressing     Problem: METABOLIC, FLUID AND ELECTROLYTES - ADULT  Goal: Electrolytes maintained within normal limits  Description: INTERVENTIONS:  - Monitor labs and assess patient for signs and symptoms of electrolyte imbalances  - Administer electrolyte replacement as ordered  - Monitor response to electrolyte replacements, including repeat lab results as appropriate  - Instruct patient on fluid and nutrition as appropriate  9/9/2020 0734 by Kashif Ackerman RN  Outcome: Progressing  9/9/2020 0733 by Kashfi Ackerman RN  Outcome: Progressing     Problem: SKIN/TISSUE INTEGRITY - ADULT  Goal: Incision(s), wounds(s) or drain site(s) healing without S/S of infection  Description: INTERVENTIONS  - Assess and document risk factors for skin impairment   - Assess and document dressing, incision, wound bed, drain sites and surrounding tissue  - Consider nutrition services referral as needed  - Oral mucous membranes remain intact  - Provide patient/ family education  9/9/2020 0734 by Kashif Ackerman RN  Outcome: Progressing  9/9/2020 0733 by Kashif Ackerman RN  Outcome: Progressing     Problem: PAIN - ADULT  Goal: Verbalizes/displays adequate comfort level or baseline comfort level  Description: Interventions:  - Encourage patient to monitor pain and request assistance  - Assess pain using appropriate pain scale  - Administer analgesics based on type and severity of pain and evaluate response  - Implement non-pharmacological measures as appropriate and evaluate response  - Consider cultural and social influences on pain and pain management  - Notify physician/advanced practitioner if interventions unsuccessful or patient reports new pain  9/9/2020 0734 by Kashif Ackerman RN  Outcome: Progressing  9/9/2020 0733 by Kashif Ackerman RN  Outcome: Progressing     Problem: SAFETY ADULT  Goal: Patient will remain free of falls  Description: INTERVENTIONS:  - Assess patient frequently for physical needs  -  Identify cognitive and physical deficits and behaviors that affect risk of falls    -  [place] fall precautions as indicated by assessment   - Educate patient/family on patient safety including physical limitations  - Instruct patient to call for assistance with activity based on assessment  - Modify environment to reduce risk of injury  - Consider OT/PT consult to assist with strengthening/mobility  9/9/2020 0734 by Chapo Rincon RN  Outcome: Progressing  9/9/2020 0733 by Chapo Rincon RN  Outcome: Progressing  Goal: Maintain or return to baseline ADL function  Description: INTERVENTIONS:  -  Assess patient's ability to carry out ADLs; assess patient's baseline for ADL function and identify physical deficits which impact ability to perform ADLs (bathing, care of mouth/teeth, toileting, grooming, dressing, etc )  - Assess/evaluate cause of self-care deficits   - Assess range of motion  - Assess patient's mobility; develop plan if impaired  - Assess patient's need for assistive devices and provide as appropriate  - Encourage maximum independence but intervene and supervise when necessary  - Involve family in performance of ADLs  - Assess for home care needs following discharge   - Consider OT consult to assist with ADL evaluation and planning for discharge  - Provide patient education as appropriate  9/9/2020 0734 by Chapo Rincon RN  Outcome: Progressing  9/9/2020 0733 by Chapo Rincon RN  Outcome: Progressing  Goal: Maintain or return mobility status to optimal level  Description: INTERVENTIONS:  - Assess patient's baseline mobility status (ambulation, transfers, stairs, etc )    - Identify cognitive and physical deficits and behaviors that affect mobility  - Identify mobility aids required to assist with transfers and/or ambulation (gait belt, sit-to-stand, lift, walker, cane, etc )  - [place] fall precautions as indicated by assessment  - Record patient progress and toleration of activity level on Mobility SBAR; progress patient to next Phase/Stage  - Instruct patient to call for assistance with activity based on assessment  - Consider rehabilitation consult to assist with strengthening/weightbearing, etc   9/9/2020 0734 by Chapo Rincon RN  Outcome: Progressing  9/9/2020 0733 by Chapo Rincon RN  Outcome: Progressing     Problem: DISCHARGE PLANNING  Goal: Discharge to home or other facility with appropriate resources  Description: INTERVENTIONS:  - Identify barriers to discharge w/patient and caregiver  - Arrange for needed discharge resources and transportation as appropriate  - Identify discharge learning needs (meds, wound care, etc )  - Arrange for interpretive services to assist at discharge as needed  - Refer to Case Management Department for coordinating discharge planning if the patient needs post-hospital services based on physician/advanced practitioner order or complex needs related to functional status, cognitive ability, or social support system  9/9/2020 0734 by Juan Rand RN  Outcome: Progressing  9/9/2020 0733 by Juan Rand RN  Outcome: Progressing     Problem: Potential for Falls  Goal: Patient will remain free of falls  Description: INTERVENTIONS:  - Assess patient frequently for physical needs  -  Identify cognitive and physical deficits and behaviors that affect risk of falls    -  [place] fall precautions as indicated by assessment   - Educate patient/family on patient safety including physical limitations  - Instruct patient to call for assistance with activity based on assessment  - Modify environment to reduce risk of injury  - Consider OT/PT consult to assist with strengthening/mobility  9/9/2020 0734 by Juan Rand RN  Outcome: Progressing  9/9/2020 0733 by Juan Rand RN  Outcome: Progressing     Problem: Prexisting or High Potential for Compromised Skin Integrity  Goal: Skin integrity is maintained or improved  Description: INTERVENTIONS:  - Identify patients at risk for skin breakdown  - Assess and monitor skin integrity  - Assess and monitor nutrition and hydration status  - Monitor labs   - Assess for incontinence   - Turn and reposition patient  - Assist with mobility/ambulation  - Relieve pressure over bony prominences  - Avoid friction and shearing  - Provide appropriate hygiene as needed including keeping skin clean and dry  - Evaluate need for skin moisturizer/barrier cream  - Collaborate with interdisciplinary team   - Patient/family teaching  - Consider wound care consult   9/9/2020 0734 by Julio César Suggs RN  Outcome: Progressing  9/9/2020 0733 by Julio César Suggs, RN  Outcome: Progressing

## 2020-09-09 NOTE — PLAN OF CARE
Problem: OCCUPATIONAL THERAPY ADULT  Goal: Performs self-care activities at highest level of function for planned discharge setting  See evaluation for individualized goals  Description: Treatment Interventions: ADL retraining, Endurance training, Patient/family training, Equipment evaluation/education, Compensatory technique education, Energy conservation, Activityengagement          See flowsheet documentation for full assessment, interventions and recommendations  Note: Limitation: Decreased ADL status, Decreased endurance, Decreased high-level ADLs, Decreased self-care trans  Prognosis: Good  Assessment: Pt is a 79 y o  male who was admitted to [facility] on 9/7/2020 with Colonic masss/p laparoscopic subtotal colectomy with liver biopsy, partial omentectomy on 9/8 , +PCA pump, urethral catheter, metastaes of liver, cancer of descending colon, cancer of splenic fixure, glaucoma, hyperlipidemia  Pt's problem list also includes PMH of heart murmur, glaucoma, BPH, aortic stenosis   At baseline pt was completing I aDL's/IADL's, no AD with functional ambulation, +drives, retired  Pt lives alone in a [facility] with a first floor set-up Currently pt requires set-up UB, mod a LB 2* to incisional pain for overall ADLS and min a/cg with RW assistance with IV pole/PCA pump for functional mobility/transfers  Pt currently presents with impairments in the following categories -difficulty performing ADLS and difficulty performing IADLS  activity tolerance and endurance  These impairments, as well as pt's fatigue, pain and risk for falls  limit pt's ability to safely engage in all baseline areas of occupation, includingdressing, toileting, functional mobility/transfers, community mobility, house maintenance, medication management, meal prep and cleaning From OT standpoint, recommend home with family support upon D/C  OT will continue to follow to address the below stated goals        OT Discharge Recommendation: Return to previous environment with social support  OT - OK to Discharge:  Yes

## 2020-09-09 NOTE — ANESTHESIA POSTPROCEDURE EVALUATION
Post-Op Assessment Note    CV Status:  Stable  Pain Score: 0    Pain management: adequate     Mental Status:  Alert and awake   Hydration Status:  Euvolemic   PONV Controlled:  Controlled   Airway Patency:  Patent      Post Op Vitals Reviewed: Yes      Staff: Anesthesiologist, CRNA         No complications documented      /80 (09/08/20 2001)    Temp (!) 97 3 °F (36 3 °C) (09/08/20 2001)    Pulse 69 (09/08/20 2001)   Resp 18 (09/08/20 2001)    SpO2 99 % (09/08/20 2001)

## 2020-09-09 NOTE — PROGRESS NOTES
Progress Note - Colorectal   Rahat Fill 79 y o  male MRN: [id number]  Unit/Bed#: [facility] 801-01 Encounter: [id number]      Objective:  Patient doing well this morning  Slept the evening, states pain is controlled, has hardly used his PCA  Denies nausea, no emesis  [place] is draining ray urine  Patient has yet to be out of bed since not returning to the floor until about 9:00 p m  last evening  No flatus, no bowel movement  [address]    Blood pressure 117/74, pulse 85, temperature (!) 97 4 °F (36 3 °C), resp  rate 18, height 5' 10" (1 778 m), weight 77 1 kg (170 lb), SpO2 94 %  ,Body mass index is 24 39 kg/m²  Intake/Output Summary (Last 24 hours) at 9/9/2020 0526  Last data filed at 9/9/2020 0522  Gross per 24 hour   Intake 3471 73 ml   Output 700 ml   Net 2771 73 ml       Invasive Devices     Peripheral Intravenous Line            Peripheral IV 09/08/20 Left Hand less than 1 day    Peripheral IV 09/08/20 Right Antecubital less than 1 day          Drain            Urethral Catheter Non-latex 16 Fr  less than 1 day                Physical Exam:   Abdomen:  Soft, does not appear distended, small midline wound is clean and dry, trocar sites clean and dry  Minimal incisional tenderness  Extremities:  No calf tenderness, no pedal edema,venadynes on and functioning    Lab, Imaging and other studies:  Pending  VTE Pharmacologic Prophylaxis: Heparin  VTE Mechanical Prophylaxis: sequential compression device    Assessment:  POD # 1 laparoscopic subtotal colectomy, liver biopsy, partial omentectomy - Dr Juve Santo    Plan:  1  Change IV fluids to maintenance and reduced to 84 mL/hour  2  Clears  3  ?  [place] catheter this morning  4  Incentive spirometry  5  Continue subcu heparin for DVT prophylaxis  6  Continue to monitor I&Os  7  Out of bed ambulate today  8  PT OT  9  Check a m   Labs

## 2020-09-09 NOTE — OCCUPATIONAL THERAPY NOTE
Occupational Therapy Evaluation     Patient Name: Ko Cruz  Today's Date: 9/9/2020  Problem List  Principal Problem:    Colonic mass    Past Medical History  Past Medical History:   Diagnosis Date    Aortic stenosis     BPH with obstruction/lower urinary tract symptoms     Frequency of urination     Glaucoma     Heart murmur     Since birth    Hyperlipidemia     Nocturia     Seasonal allergies     Wears glasses      Past Surgical History  Past Surgical History:   Procedure Laterality Date    COLECTOMY LAPAROSCOPIC Left 9/8/2020    Procedure: COLECTOMY, SUBTOTAL LAPAROSCOPIC/HANDPORT ASSISTED;  Surgeon: Rell Darby MD;  Location:  MAIN OR;  Service: Colorectal    COLONOSCOPY      EGD      INGUINAL HERNIA REPAIR Right     INGUINAL HERNIA REPAIR Left     LUNG BIOPSY      Left lung  Was fine    PA CYSTOURETHRO W/IMPLANT N/A 9/6/2019    Procedure: CYSTOSCOPY WITH INSERTION UROLIFT;  Surgeon: Tiff Zabala MD;  Location: [facility] Main OR;  Service: Urology    TONSILLECTOMY      WISDOM TOOTH EXTRACTION               09/09/20 0827   Note Type   Note type Eval only   Restrictions/Precautions   Weight Bearing Precautions Per Order No   Other Precautions Cognitive; Chair Alarm; Bed Alarm;Telemetry;Multiple lines; Fall Risk;Pain  (+uretheral catheter, PCA pump)   Pain Assessment   Pain Assessment Tool [facility] Pain Intervention(s) Ambulation/increased activity;Repositioned;Elevated;Rest;Emotional support   Pain Rating: FLACC (Rest) - Face 1   Pain Rating: FLACC (Rest) - Legs 0   Pain Rating: FLACC (Rest) - Activity 0   Pain Rating: FLACC (Rest) - Cry 0   Pain Rating: FLACC (Rest) - Consolability 0   Score: FLACC (Rest) 1   Pain Rating: FLACC (Activity) - Face 1   Pain Rating: FLACC (Activity) - Legs 0   Pain Rating: FLACC (Activity) - Activity 0   Pain Rating: FLACC (Activity) - Cry 0   Pain Rating: FLACC (Activity) - Consolability 0   Score: FLACC (Activity) 1   Home Living   Type of Home House   Home Layout Two level; Able to live on main level with bedroom/bathroom; Performs ADLs on one level;1/2 bath on main level   Bathroom Shower/Tub Tub/shower unit   [employer] Raised  (comfort level height)   Bathroom Equipment Grab bars in shower   [address] [facility]   Prior Function   Level of [place] Independent with ADLs and functional mobility   Lives With Alone   Receives Help From Family   ADL Assistance Independent   IADLs Independent   Falls in the last 6 months 0   Vocational Retired   Lifestyle   Autonomy I with ADL's/IADL's, no AD with functional ambulation, +drives, retired   Reciprocal Relationships supportive sister lives close, +daughter will stay with pt at discharge both family members can assist as needed   Service to Others retired   Intrinsic Gratification go out with friends   Psychosocial   Psychosocial (WDL) WDL   ADL   Eating Assistance 7  Independent   Grooming Assistance 7  Independent   UB Bathing Assistance 5  Supervision/Setup   LB Bathing Assistance 3  Moderate Assistance   LB Bathing Deficit Left lower leg including foot;Right lower leg including foot; Buttocks   UB Dressing Assistance 4  Minimal Assistance   LB Dressing Assistance 2  Maximal Assistance   LB Dressing Deficit Don/doff R sock; Don/doff L sock  (2* to incisional pain )   Toileting Assistance    (+uretheral catheter)   Bed Mobility   Supine to Sit 4  Minimal assistance   Additional items Assist x 1;Verbal cues;HOB elevated   Additional Comments pt left in chair at end of session wtih all needs met   Transfers   Sit to Stand 4  Minimal assistance   Additional items Assist x 1   Stand to Sit 4  Minimal assistance   Additional items Assist x 1   Functional Mobility   Functional Mobility 4  Minimal assistance   Additional Comments min a/cg with RW assistance with IV pole/PCA pump   Balance   Static Sitting Good   Dynamic Sitting Fair   Static Standing Fair -   Dynamic Standing Poor +   Ambulatory Poor +   Activity Tolerance   Activity Tolerance Patient limited by fatigue;Patient limited by pain   Medical Staff Made Aware PT mary   Nurse Made Aware RN cleared pt for therapy   RUE Assessment   RUE Assessment WFL   LUE Assessment   LUE Assessment WFL   Hand Function   Gross Motor Coordination Functional   Fine Motor Coordination Functional   Vision-Basic Assessment   Current Vision Wears glasses all the time   Cognition   Overall Cognitive Status [facility]   Arousal/Participation Alert; Cooperative   Attention Within functional limits   Orientation Level Oriented X4   Memory Within functional limits   Following Commands Follows all commands and directions without difficulty   Comments pt pleasant and cooperative, demonstrated good cognition with evaluation including safety awarenss, insight into deficits, direction following   Assessment   Limitation Decreased ADL status; Decreased endurance;Decreased high-level ADLs; Decreased self-care trans   Prognosis Good   Assessment Pt is a 79 y o  male who was admitted to [facility] on 9/7/2020 with Colonic masss/p laparoscopic subtotal colectomy with liver biopsy, partial omentectomy on 9/8 , +PCA pump, urethral catheter, metastaes of liver, cancer of descending colon, cancer of splenic fixure, glaucoma, hyperlipidemia  Pt's problem list also includes PMH of heart murmur, glaucoma, BPH, aortic stenosis   At baseline pt was completing I aDL's/IADL's, no AD with functional ambulation, +drives, retired  Pt lives alone in a [facility] with a first floor set-up Currently pt requires set-up UB, mod a LB 2* to incisional pain for overall ADLS and min a/cg with RW assistance with IV pole/PCA pump for functional mobility/transfers  Pt currently presents with impairments in the following categories -difficulty performing ADLS and difficulty performing IADLS  activity tolerance and endurance   These impairments, as well as pt's fatigue, pain and risk for falls limit pt's ability to safely engage in all baseline areas of occupation, includingdressing, toileting, functional mobility/transfers, community mobility, house maintenance, medication management, meal prep and cleaning From OT standpoint, recommend home with family support upon D/C  OT will continue to follow to address the below stated goals  Goals   Patient Goals  sit up in chair   LTG Time Frame 10-14   Long Term Goal #1 see goals below   Plan   Treatment Interventions ADL retraining; Endurance training;Patient/family training;Equipment evaluation/education; Compensatory technique education; Energy conservation; Activityengagement   Goal Expiration Date 09/23/20   OT Frequency 3-5x/wk   Recommendation   OT Discharge Recommendation Return to previous environment with social support   OT - OK to Discharge Yes   Barthel Index   Feeding 10   Bathing 0   Grooming Score 5   Dressing Score 5   Bladder Score 0   Bowels Score 10   Toilet Use Score 5   Transfers (Bed/Chair) Score 10   Mobility (Level Surface) Score 10   Stairs Score 0   Barthel Index Score 55   Modified [place] Scale   Modified Yuri Scale 4      Occupational Therapy Goals:    *Mod I with bed mobility to engage in functional tasks  *Mod I Adl's after setup with use of AE PRN  *Mod I toileting and clothing management   *Mod I functional mobility and transfers to/from all surfaces with Fair + dynamic balance and safety for participation in dynamic adls and iadl tasks   *Demonstrate good carryover with safe use of RW during functional tasks   *Assess DME needs   *Increase activity tolerance to 25-30 minutes for participation in adls and enjoyable activities  *Mod I with Simulated IADL management task      Ariana STOVER, OTR/L

## 2020-09-09 NOTE — PLAN OF CARE
Problem: PHYSICAL THERAPY ADULT  Goal: Performs mobility at highest level of function for planned discharge setting  See evaluation for individualized goals  Description: Treatment/Interventions: Functional transfer training, LE strengthening/ROM, Elevations, Endurance training, Therapeutic exercise, Patient/family training, Equipment eval/education, Bed mobility, Gait training          See flowsheet documentation for full assessment, interventions and recommendations  Note: Prognosis: Good  Problem List: Decreased strength, Decreased endurance, Impaired balance, Decreased mobility, Pain  Assessment: Pt is a 79 y o  male seen for PT evaluation s/p admit to [facility] on 9/7/2020  Pt was admitted with a primary dx of: colonic mass s/p colectomy 9/8  PT now consulted for assessment of mobility and d/c needs  Pt with Up as tolerated orders  Pts current comorbidities and personal factors effecting treatment include: Aortic stenosis, BPH, Glaucoma, resides alone  Pts current clinical presentation is Unstable/ Unpredictable (high complexity) due to Ongoing medical management for primary dx, Increased reliance on more restrictive AD compared to baseline, Decreased activity tolerance compared to baseline, Fall risk, on PCA  Prior to admission, pt was independent without AD, active  Upon evaluation, pt currently is requiring [place] for bed mobility; supervision for transfers and supervision for ambulation 130 ft w/ RW  Pt presents at PT eval functioning below baseline and currently w/ overall mobility deficits 2* to: BLE weakness, impaired balance, decreased endurance, gait deviations, decreased activity tolerance compared to baseline, decreased functional mobility tolerance compared to baseline, decreased safety awareness, fall risk  Pt currently at a fall risk 2* to impairments listed above    Pt will continue to benefit from skilled acute PT interventions to address stated impairments; to maximize functional mobility; for ongoing pt/ family training; and DME needs  At conclusion of PT session pt returned back in chair and chair alarm engaged with phone and call bell within reach  Pt denies any further questions at this time  Recommend home with family care (pt reports daughter will be staying with him) upon hospital D/C  PT Discharge Recommendation: Return to previous environment with social support(home with family care)     PT - OK to Discharge: No    See flowsheet documentation for full assessment

## 2020-09-09 NOTE — OP NOTE
OPERATIVE REPORT  PATIENT NAME: Nava Vargas    :  1940  MRN: [id number]  Pt Location: [facility] OR ROOM 06    SURGERY DATE: 2020    Surgeon(s) and Role:     * Roc Cabral MD - Primary     * Latonia Grace PA-C - Assisting  A PA WAS REQUIRED FOR EXPERT RETRACTION  NO QUALIFIED RESIDENT WAS AVAILABLE FOR THE PROCEDURE  Preop Diagnosis:  Colonic mass [K63 89], descending colon  Liver metastases    Post-Op Diagnosis Codes:     * Colonic mass [K63 89], descending colon  Carcinomatosis  Liver metastases    Procedure(s) (LRB):  COLECTOMY, SUBTOTAL LAPAROSCOPIC/HANDPORT ASSISTED (Left), core liver biopsy, partial omentectomy    Specimen(s):  ID Type Source Tests Collected by Time Destination   1 : Omental lesion Tissue Omentum TISSUE EXAM Roc Cabral MD 2020 1823    2 :  Tissue Large Intestine, Splenic Flexure TISSUE EXAM Roc Cabral MD 2020 190    3 : core liver biopsy Tissue Liver TISSUE EXAM Roc Cabral MD 2020 191        Estimated Blood Loss:   Minimal    Drains:  Urethral Catheter Non-latex 16 Fr  (Active)   Number of days: 0       Anesthesia Type:   General    Operative Indications:  Colonic mass [K63 89], descending colon  Liver metastases    Operative Findings:     * Colonic mass [K63 89], descending colon  Carcinomatosis  Liver metastases    Complications:   None    Procedure and Technique:  The patient was placed in a supine position with the legs and Taurus boots  The abdomen was prepped widely using ChloraPrep and draped in a sterile manner  A time-out was done  We placed a midline incision around the umbilicus  Passing through the fascial layer, there was evidence of a prior herniorrhaphy at the umbilicus  The peritoneum was entered without difficulty  A GelPort was placed through the incision that was barely large enough to admit gloved hand  There were no adhesions  Exploration revealed multiple liver metastases      11 mm trocars were placed in the left upper and right upper abdomen  5 mm trocars were placed in the right lower and left lower abdomen  Palpation revealed an obvious mass in the omentum  This was excised widely using EnSeal technique and sent for pathologic evaluation  At least 1 other omental metastasis was identified  The bowel surfaces were otherwise unremarkable  Palpation revealed the large mass in the descending colon just distal to the splenic flexure  The liver metastases were obvious and multiple  A Aniket-Cut biopsy of the liver was done and the biopsy site was cauterized  No bleeding occurred  Attention was directed to the tumor  The tumor seemed to be minimally adherent to the abdominal sidewall  Simple pressure on the area allowed a hand to be slid between the abdominal peritoneum and the peritoneum of the abdominal wall  The tumor was not truly invasive into the parietal peritoneum  There was whitish change of the serosa of the colon itself and the visceral peritoneum in the area of the colon  I initiated dissection on the white line of Toldt with the creation of a or wider dissection at the side of the tumor  There did not appear to be any penetration into the bowel retroperitoneum  The a vascular planes were identified distal and proximal to the tumor itself  These a vascular planes were utilized to separate the retroperitoneum from the mesentery of the colon  Gerota fascia was identified and dissection anterior to it and posterior to the mesentery of the descending colon and splenic flexure was accomplished  We then began a medial dissection  The medial dissection allowed for entry into the a vascular plane, separation of the kidney from the mesentery, and blunt dissection distally with care to avoid damage to the ureter which was not identified  I kept the dissection relatively high it on the descending colon    Laterally, the dissection was carried down to the mid sigmoid colon lateral attachments but only at the peritoneal level with blunt dissection of the a vascular plane in that region  Once we had the bowel lifted anteriorly and medially, I began to sequentially divide the mesentery using EnSeal technique  This included the splenic flexure  The dense omentum was  from the colon onto the transverse colon  These maneuvers allowed for delivery of the splenic flexure inferomedially between the mid transverse colon and proximal sigmoid colon  We then removed the GelPort lid and delivered the bowel into the GelPort sheath  A double stapled technique was used to divide and anastomose the bowel  The mesentery was 1st divided using EnSeal, preserving vascular structures and nice pink, well-vascularized bowel  The left side of the transverse colon was anastomosed to the junction between the descending and sigmoid colon  The bowel was supple at the point of anastomosis  A double stapled anastomosis was accomplished using an echelon 60 mm stapler with green loads  The lumen of the anastomosis was widely patent  The staple lines were inspected anteriorly and posteriorly  The staple line across the bowel was done in 2 fires and these staples were intact and the bowel well-vascularized at all points  At the apex of the staple line along the bowel, a short run of imbricating 4 0 PDS suture was placed  The bowel was repositioned in the abdominal cavity after cleansing and drying  No significant contamination was noted during the performance of the anastomosis  The bowel appeared to be empty and there was no evidence of obstructed, dilated proximal bowel at at the tumor  The GelPort lid was replaced and air was reinsufflated  The bowel was inspected at the anastomosis was found to be well-vascularized, supple, and under no tension  The small bowel was brought medial to the anastomosis and the anastomosis was placed against the retroperitoneum    The dissection planes were inspected and were found to have no surgical bleeding  The omentum was brought down over the bowel  Any fluid is in the areas of dissection were suctioned free  The trocars were removed  They are all non cutting trocars  There were not closed at the fascial level  The GelPort was removed and the wounds were irrigated with saline and dried  The midline wound was closed using running 1 PDS suture placed from either into the wound  The wounds were again irrigated and dried  There were closed using running or interrupted subcuticular 4 O Monocryl suture depending on the length of the wounds  The wounds were again cleansed and dried and exofin was applied to glue the skin  Sponge needle and instrument counts were correct  Wand technique revealed no retained gauze       I was present for the entire procedure    Patient Disposition:  PACU     SIGNATURE: Loida Romeo MD  DATE: September 8, 2020  TIME: 8:03 PM

## 2020-09-09 NOTE — PHYSICAL THERAPY NOTE
Physical Therapy Evaluation    Patient's Name: Gabriel Darby    Admitting Diagnosis  Cancer of descending colon [facility]) [C18 6]    Problem List  Patient Active Problem List   Diagnosis    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 urinary tract symptoms    Metastases to the liver [facility] [address] [facility]    Cancer of descending colon ([facility] [address] [facility]    Cancer of splenic flexure ([facility] [address] [facility]    Hyperlipidemia    Glaucoma    Seasonal allergies    Colonic mass       Past Medical History  Past Medical History:   Diagnosis Date    Aortic stenosis     BPH with obstruction/lower urinary tract symptoms     Frequency of urination     Glaucoma     Heart murmur     Since birth    Hyperlipidemia     Nocturia     Seasonal allergies     Wears glasses        Past Surgical History  Past Surgical History:   Procedure Laterality Date    COLECTOMY LAPAROSCOPIC Left 9/8/2020    Procedure: COLECTOMY, SUBTOTAL LAPAROSCOPIC/HANDPORT ASSISTED;  Surgeon: Ferny Samuel MD;  Location: [facility] MAIN OR;  Service: Colorectal    COLONOSCOPY      EGD      INGUINAL HERNIA REPAIR Right     INGUINAL HERNIA REPAIR Left     LUNG BIOPSY      Left lung  Was fine    SC CYSTOURETHRO W/IMPLANT N/A 9/6/2019    Procedure: CYSTOSCOPY WITH INSERTION UROLIFT;  Surgeon: Ariel Valdez MD;  Location: [facility];  Service: Urology    TONSILLECTOMY      [address] EXTRACTION          09/09/20 0849   PT Last Visit   PT Visit Date 09/09/20   Note Type   Note type Eval only   Pain Assessment   Pain Assessment Tool Pain Assessment not indicated - pt denies pain   Pain Score No Pain   Home Living   Type of [address] [facility] Two level; Able to live on main level with bedroom/bathroom;1/2 bath on main level   [address] [facility]   Prior Function   Level of [place] Independent with ADLs and functional mobility   Lives With Alone   ADL Assistance Independent   IADLs Independent   Falls in the last 6 months 0   Vocational Retired   Comments Pt active at baseline, no AD, enjoys doing home projects, meets his friends at [employer] daily  Restrictions/Precautions   Weight Bearing Precautions Per Order No   Other Precautions Cognitive; Chair Alarm; Bed Alarm; Fall Risk;Multiple lines   General   Family/Caregiver Present No   Cognition   Overall Cognitive Status WFL   Arousal/Participation Alert   Attention Within functional limits   Orientation Level Oriented X4   Memory Within functional limits   Following Commands Follows all commands and directions without difficulty   Comments Pt pleasant and motivated to participate in therapy session   RLE Assessment   RLE Assessment WFL  (Grossly 4+/5)   LLE Assessment   LLE Assessment WFL  (Grossly 4+/5)   Light Touch   RLE Light Touch Grossly intact   LLE Light Touch Grossly intact   Bed Mobility   Supine to Sit 4  Minimal assistance   Additional items Assist x 1; Increased time required;Verbal cues;HOB elevated   Additional Comments VC's for log rolling technique   Transfers   Sit to Stand 5  Supervision   Additional items Assist x 1; Increased time required;Verbal cues   Stand to Sit 5  Supervision   Additional items Assist x 1; Increased time required;Verbal cues   Additional Comments with RW   Ambulation/Elevation   Gait pattern Foward flexed;Decreased foot clearance   Gait Assistance 5  Supervision   Additional items Assist x 1   Assistive Device Rolling walker   Distance 130 ft, cues for erect posture, pacing   Balance   Static Sitting Good   Dynamic Sitting Fair +   Static Standing Fair -   Dynamic Standing Fair -   Ambulatory Poor +   Activity Tolerance   Activity Tolerance Patient limited by fatigue   Medical Staff Made Aware OT, Mary   Nurse Made Aware RN updated  Chair alarm intact   Assessment   Prognosis Good   Problem List Decreased strength;Decreased endurance; Impaired balance;Decreased mobility;Pain   Assessment Pt is a 79 y o  male seen for PT evaluation s/p admit to [facility] on 9/7/2020  Pt was admitted with a primary dx of: colonic mass s/p colectomy 9/8  PT now consulted for assessment of mobility and d/c needs  Pt with Up as tolerated orders  Pts current comorbidities and personal factors effecting treatment include: Aortic stenosis, BPH, Glaucoma, resides alone  Pts current clinical presentation is Unstable/ Unpredictable (high complexity) due to Ongoing medical management for primary dx, Increased reliance on more restrictive AD compared to baseline, Decreased activity tolerance compared to baseline, Fall risk, on PCA  Prior to admission, pt was independent without AD, active  Upon evaluation, pt currently is requiring Ruddy for bed mobility; supervision for transfers and supervision for ambulation 130 ft w/ RW  Pt presents at PT eval functioning below baseline and currently w/ overall mobility deficits 2* to: BLE weakness, impaired balance, decreased endurance, gait deviations, decreased activity tolerance compared to baseline, decreased functional mobility tolerance compared to baseline, decreased safety awareness, fall risk  Pt currently at a fall risk 2* to impairments listed above  Pt will continue to benefit from skilled acute PT interventions to address stated impairments; to maximize functional mobility; for ongoing pt/ family training; and DME needs  At conclusion of PT session pt returned back in chair and chair alarm engaged with phone and call bell within reach  Pt denies any further questions at this time  Recommend home with family care (pt reports daughter will be staying with him) upon hospital D/C  Goals   Patient Goals to walk   STG Expiration Date 09/19/20   Short Term Goal #1 In 10 days pt will be able to: 1  Demonstrate ability to perform all aspects of bed mobility independently to increase functional independence  2  Perform functional transfers independently to facilitate safe return to previous living environment    3   Ambulate 150 ft with LRAD independently with stable vitals to improve safety with household distances and reduce fall risk  4  Climb 2 steps with supervision and 1 HR to simulate entrance to home  5  Improve LE strength grades by 1 to increase ease of functional mobility with transfers and gait  6  Pt will demonstrate improved balance by one grade order to decrease risk of falls  PT Treatment Day 0   Plan   Treatment/Interventions Functional transfer training;LE strengthening/ROM; Elevations; Endurance training; Therapeutic exercise;Patient/family training;Equipment eval/education; Bed mobility;Gait training   PT Frequency Other (Comment)  (3-5x/week)   Recommendation   PT Discharge Recommendation Return to previous environment with social support  (home with family care)   PT - OK to Discharge No   Additional Comments stairs, increase ambulation distance   Barthel Index   Feeding 10   Bathing 0   Grooming Score 5   Dressing Score 5   Bladder Score 0   Bowels Score 10   Toilet Use Score 5   Transfers (Bed/Chair) Score 10   Mobility (Level Surface) Score 10   Stairs Score 0   Barthel Index Score 55       Kanchan Liner, PT, DPT

## 2020-09-09 NOTE — SOCIAL WORK
CM spoke with pt to discuss Kurt Massey for wound care  Pt gave pt a list of agencies within a 20 mile radius from his home  Pt requesting a referral be placed to [facility]  As per request, referral placed in [facility]  Awaiting response  Pt has been accepted to [facility] at time of d/c  A post acute care recommendation was made by your care team for Kurt Massey  Discussed Freedom of Choice with patient  List of agencies given to patient via in person  patient aware the list is custom filtered for them by zip code location and that [facility] post acute providers are designated

## 2020-09-09 NOTE — UTILIZATION REVIEW
Initial Clinical Review    Admission: Date/Time/Statement:   Admission Orders (From admission, onward)     Ordered        09/07/20 1504  Inpatient Admission  Once                   Orders Placed This Encounter   Procedures    Inpatient Admission     Standing Status:   Standing     Number of Occurrences:   1     Order Specific Question:   Admitting Physician     Answer:   Raissa Mayers     Order Specific Question:   Level of Care     Answer:   Med Surg [16]     Order Specific Question:   Estimated length of stay     Answer:   More than 2 Midnights     Order Specific Question:   Certification     Answer:   I certify that inpatient services are medically necessary for this patient for a duration of greater than two midnights  See H&P and MD Progress Notes for additional information about the patient's course of treatment  Assessment/Plan:   Mr  Nataliia Dixon is an 80 yo elective admission for pre-op preparation for elective L Hemicolectomy for CA of splenic flexure  The diagnosis was found incidentially when he came to the ED on 8/19 for treatment of UTI  He had colonoscopy showing near-obstructing mass at splenic flexure which no scope - adult or pediatric - could pass through  He will go to the OR 9/8    NPO at MN and IV fluids       +++++++++++++++++++++++++++++++  9/8 OR - S/P COLECTOMY, SUBTOTAL LAPAROSCOPIC/HANDPORT ASSISTED (Left), core liver biopsy, partial omentectomy     +++++++++++++++++++++++++++++++       Pain Score       09/07/20 1357       No Pain          Wt Readings from Last 1 Encounters:   09/07/20 77 1 kg (170 lb)     Additional Vital Signs:   09/08/20 07:31:23   98 4 °F (36 9 °C)   74   18   128/72   91   95 %       09/07/20 23:34:59   99 6 °F (37 6 °C)   86   16   133/82   99   97 %       09/07/20 21:17:44      82            96 %       09/07/20 1951                     None (Room air)    09/07/20 14:05:07   100 5 °F (38 1 °C)   85   16   140/86   104            Pertinent Labs/Diagnostic Test Results:     8/19 CT abd, pelvis - 1  Circumferential 4 cm lesion constricting the proximal descending colon just distal to the splenic flexure, concerning for malignancy  Significant amount of stool in the proximal colon may indicate chronic low-grade /partial obstruction and/or constipation  Recommend surgical consultation  2   Numerous lesions throughout the liver measuring up to 4 6 cm, most consistent with metastatic disease  3   Enlarged prostate  No obstructive uropathy or pyelonephritis        Results from last 7 days   Lab Units 09/09/20  0438 09/08/20  0457   WBC Thousand/uL 9 98 11 40*   HEMOGLOBIN g/dL 12 1 12 3   HEMATOCRIT % 38 1 37 6   PLATELETS Thousands/uL 277 289   NEUTROS ABS Thousands/µL 9 30* 9 33*         Results from last 7 days   Lab Units 09/09/20  0438 09/08/20  0457   SODIUM mmol/L 139 137   POTASSIUM mmol/L 4 6 4 2   CHLORIDE mmol/L 106 105   CO2 mmol/L 25 26   ANION GAP mmol/L 8 6   BUN mg/dL 9 12   CREATININE mg/dL 0 84 0 85   EGFR ml/min/1 73sq m 82 82   CALCIUM mg/dL 8 5 9 0   MAGNESIUM mg/dL 2 2  --    PHOSPHORUS mg/dL 3 7  --      Results from last 7 days   Lab Units 09/09/20  0438 09/08/20  0457   GLUCOSE RANDOM mg/dL 217* 108       Past Medical History:   Diagnosis Date    Aortic stenosis     BPH with obstruction/lower urinary tract symptoms     Frequency of urination     Glaucoma     Heart murmur     Since birth    Hyperlipidemia     Nocturia     Seasonal allergies     Wears glasses      Present on Admission:   Colonic mass    Admitting Diagnosis: Cancer of descending colon ([facility] [address] ) [C18 6]     Age/Sex: 79 y o  male     Admission Orders:    Scheduled Medications:  heparin (porcine), 5,000 Units, Subcutaneous, Q8H [address]  latanoprost, 1 drop, Both Eyes, HS  pravastatin, 20 mg, Oral, Daily With Dinner  timolol, 1 drop, Both Eyes, Daily      Continuous IV Infusions:  dextrose 5 % and sodium chloride 0 45 % with KCl 20 mEq/L, 84 mL/hr, Intravenous, Continuous  HYDROmorphone, , Intravenous, Continuous      PRN Meds:  ondansetron, 4 mg, Intravenous, Q6H PRN    SCDs  NPO p MN  IV Fluids  Up as tolerated   Wound Care Consult for stoma marking  IP CONSULT TO CASE MANAGEMENT    Network Utilization Review Department  [email or web address]  ATTENTION: Please call with any questions or concerns to [phone] and carefully listen to the prompts so that you are directed to the right person  All voicemails are confidential   Sudie Crigler all requests for admission clinical reviews, approved or denied determinations and any other requests to dedicated fax number below belonging to the campus where the patient is receiving treatment   List of dedicated fax numbers for the Facilities:  [address] DENIALS (Administrative/Medical Necessity) [phone]   [address] (Maternity/NICU/Pediatrics) [phone]   Joseph Min [phone]   Yvonne Sheets [phone]   Loida Juniper [phone]   Martha Vaughn [phone]   [address] [facility] [address] [facility] [phone]   [facility]  [phone]   [address] [facility]  [address] [facility] [address] [facility] [phone]

## 2020-09-09 NOTE — SOCIAL WORK
CM met with the patient at bedside to review the CM role and discuss possible dc needs  At time of interview pt is AAOx4, pt lives alone in a 2 story home with 0 BETH in [address]  Pt was IPTA  Pt has no DME  and is ambulatory without assistance  Pt has bathroom/bedroom on second floor with full flights of steps  Pt denies any history of drug/etoh abuse, mental illness or inpatient psych admissions  Pt denies any history of SNF/Rehab or VNA  Pt does have a POA, his daughter, Radha Vuong  Pt does have a LW  CM requested copied of both    Preferred Pharmacy: [address]  Contact: Irene Randle, daughter, [phone]  PCP: Dr Danay Braden    CM reviewed d/c planning process including the following: identifying help at home, patient preference for d/c planning needs, availability of treatment team to discuss questions or concerns patient and/or family may have regarding understanding medications and recognizing signs and symptoms once discharged  CM also encouraged patient to follow up with all recommended appointments after discharge  Patient advised of importance for patient and family to participate in managing patients medical well being

## 2020-09-09 NOTE — QUICK NOTE
Post op check - Colorectal Surgery  Amber Banks 79 y o  male MRN: [id number]  Unit/Bed#: [facility] 801-01 Encounter: [id number]    Assessment:  79 y o  male now 1 Day Post-Op s/p Procedure(s) (LRB):  COLECTOMY, SUBTOTAL LAPAROSCOPIC/HANDPORT ASSISTED (Left)   He is doing well, adequate urine output    Plan:  - Diet Clear Liquid  - Pain and Nausea control PRN  - Incentive spirometry  - OOB, ambulate    Subjective/Objective     Subjective: Went to evaluate the patient after arrival to the floor  The patients pain is well controlled and the patient denies any nausea, or vomiting  Tolerating CLD  Objective:   Vitals: Blood pressure 118/76, pulse 84, temperature 97 7 °F (36 5 °C), resp  rate 18, height 5' 10" (1 778 m), weight 77 1 kg (170 lb), SpO2 98 %  ,Body mass index is 24 39 kg/m²  I/O       09/07 0701 - 09/08 0700 09/08 0701 - 09/09 0700    P  O  270 0    I V  (mL/kg) 876 7 (11 4) 2500 (32 4)    IV Piggyback 100     Total Intake(mL/kg) 1246 7 (16 2) 2500 (32 4)    Urine (mL/kg/hr) 600 700 (0 4)    Stool 0     Total Output 600 700    Net +646 7 +1800          Unmeasured Urine Occurrence 1 x     Unmeasured Stool Occurrence 1 x           Physical Exam:  Gen: NAD, Aox3, Comfortable in bed  Chest: Normal work of breathing, no respiratory distress  Abd: Soft, ND, NT, incision cdi  Ext: No Edema  Skin: Warm, Dry, Intact      Lab, Imaging and other studies: I have personally reviewed pertinent reports      VTE Pharmacologic Prophylaxis: Sequential compression device (Venodyne)   VTE Mechanical Prophylaxis: sequential compression device

## 2020-09-10 ENCOUNTER — PATIENT OUTREACH (OUTPATIENT)
Dept: HEMATOLOGY ONCOLOGY | Facility: CLINIC | Age: 80
End: 2020-09-10

## 2020-09-10 LAB
ANION GAP SERPL CALCULATED.3IONS-SCNC: 6 MMOL/L (ref 4–13)
BASOPHILS # BLD AUTO: 0.02 THOUSANDS/ΜL (ref 0–0.1)
BASOPHILS NFR BLD AUTO: 0 % (ref 0–1)
BUN SERPL-MCNC: 14 MG/DL (ref 5–25)
CALCIUM SERPL-MCNC: 8.9 MG/DL (ref 8.3–10.1)
CHLORIDE SERPL-SCNC: 103 MMOL/L (ref 100–108)
CO2 SERPL-SCNC: 30 MMOL/L (ref 21–32)
CREAT SERPL-MCNC: 0.84 MG/DL (ref 0.6–1.3)
EOSINOPHIL # BLD AUTO: 0.02 THOUSAND/ΜL (ref 0–0.61)
EOSINOPHIL NFR BLD AUTO: 0 % (ref 0–6)
ERYTHROCYTE [DISTWIDTH] IN BLOOD BY AUTOMATED COUNT: 13.1 % (ref 11.6–15.1)
GFR SERPL CREATININE-BSD FRML MDRD: 82 ML/MIN/1.73SQ M
GLUCOSE SERPL-MCNC: 158 MG/DL (ref 65–140)
HCT VFR BLD AUTO: 36.6 % (ref 36.5–49.3)
HGB BLD-MCNC: 11.8 G/DL (ref 12–17)
IMM GRANULOCYTES # BLD AUTO: 0.06 THOUSAND/UL (ref 0–0.2)
IMM GRANULOCYTES NFR BLD AUTO: 0 % (ref 0–2)
LYMPHOCYTES # BLD AUTO: 0.69 THOUSANDS/ΜL (ref 0.6–4.47)
LYMPHOCYTES NFR BLD AUTO: 5 % (ref 14–44)
MCH RBC QN AUTO: 28.2 PG (ref 26.8–34.3)
MCHC RBC AUTO-ENTMCNC: 32.2 G/DL (ref 31.4–37.4)
MCV RBC AUTO: 88 FL (ref 82–98)
MONOCYTES # BLD AUTO: 0.93 THOUSAND/ΜL (ref 0.17–1.22)
MONOCYTES NFR BLD AUTO: 6 % (ref 4–12)
NEUTROPHILS # BLD AUTO: 12.73 THOUSANDS/ΜL (ref 1.85–7.62)
NEUTS SEG NFR BLD AUTO: 89 % (ref 43–75)
NRBC BLD AUTO-RTO: 0 /100 WBCS
PLATELET # BLD AUTO: 358 THOUSANDS/UL (ref 149–390)
PMV BLD AUTO: 9.9 FL (ref 8.9–12.7)
POTASSIUM SERPL-SCNC: 3.7 MMOL/L (ref 3.5–5.3)
RBC # BLD AUTO: 4.18 MILLION/UL (ref 3.88–5.62)
SODIUM SERPL-SCNC: 139 MMOL/L (ref 136–145)
WBC # BLD AUTO: 14.45 THOUSAND/UL (ref 4.31–10.16)

## 2020-09-10 PROCEDURE — 97110 THERAPEUTIC EXERCISES: CPT

## 2020-09-10 PROCEDURE — 85025 COMPLETE CBC W/AUTO DIFF WBC: CPT | Performed by: SURGERY

## 2020-09-10 PROCEDURE — 97116 GAIT TRAINING THERAPY: CPT

## 2020-09-10 PROCEDURE — 80048 BASIC METABOLIC PNL TOTAL CA: CPT | Performed by: SURGERY

## 2020-09-10 RX ORDER — XYLITOL/YERBA SANTA
5 AEROSOL, SPRAY WITH PUMP (ML) MUCOUS MEMBRANE 4 TIMES DAILY PRN
Status: DISCONTINUED | OUTPATIENT
Start: 2020-09-10 | End: 2020-09-14 | Stop reason: HOSPADM

## 2020-09-10 RX ORDER — POTASSIUM CHLORIDE 14.9 MG/ML
20 INJECTION INTRAVENOUS ONCE
Status: COMPLETED | OUTPATIENT
Start: 2020-09-10 | End: 2020-09-10

## 2020-09-10 RX ORDER — METOCLOPRAMIDE HYDROCHLORIDE 5 MG/ML
10 INJECTION INTRAMUSCULAR; INTRAVENOUS EVERY 6 HOURS SCHEDULED
Status: COMPLETED | OUTPATIENT
Start: 2020-09-10 | End: 2020-09-11

## 2020-09-10 RX ADMIN — METOCLOPRAMIDE 10 MG: 5 INJECTION, SOLUTION INTRAMUSCULAR; INTRAVENOUS at 08:05

## 2020-09-10 RX ADMIN — ONDANSETRON 4 MG: 2 INJECTION INTRAMUSCULAR; INTRAVENOUS at 00:06

## 2020-09-10 RX ADMIN — POTASSIUM CHLORIDE 20 MEQ: 14.9 INJECTION, SOLUTION INTRAVENOUS at 08:05

## 2020-09-10 RX ADMIN — DEXTROSE, SODIUM CHLORIDE, AND POTASSIUM CHLORIDE 84 ML/HR: 5; .45; .15 INJECTION INTRAVENOUS at 11:30

## 2020-09-10 RX ADMIN — ALUMINUM HYDROXIDE, MAGNESIUM HYDROXIDE, AND SIMETHICONE 30 ML: 200; 200; 20 SUSPENSION ORAL at 00:06

## 2020-09-10 RX ADMIN — HEPARIN SODIUM 5000 UNITS: 5000 INJECTION INTRAVENOUS; SUBCUTANEOUS at 05:03

## 2020-09-10 RX ADMIN — TIMOLOL MALEATE 1 DROP: 2.5 SOLUTION/ DROPS OPHTHALMIC at 08:04

## 2020-09-10 RX ADMIN — DEXTROSE, SODIUM CHLORIDE, AND POTASSIUM CHLORIDE 100 ML/HR: 5; .45; .15 INJECTION INTRAVENOUS at 21:54

## 2020-09-10 RX ADMIN — METOCLOPRAMIDE 10 MG: 5 INJECTION, SOLUTION INTRAMUSCULAR; INTRAVENOUS at 12:21

## 2020-09-10 RX ADMIN — LATANOPROST 1 DROP: 50 SOLUTION OPHTHALMIC at 21:48

## 2020-09-10 RX ADMIN — HEPARIN SODIUM 5000 UNITS: 5000 INJECTION INTRAVENOUS; SUBCUTANEOUS at 14:53

## 2020-09-10 RX ADMIN — HEPARIN SODIUM 5000 UNITS: 5000 INJECTION INTRAVENOUS; SUBCUTANEOUS at 21:45

## 2020-09-10 RX ADMIN — METOCLOPRAMIDE 10 MG: 5 INJECTION, SOLUTION INTRAMUSCULAR; INTRAVENOUS at 17:45

## 2020-09-10 RX ADMIN — Medication: at 17:45

## 2020-09-10 NOTE — PROGRESS NOTES
Progress Note - Colorectal   Cierra Block 79 y o  male MRN: [id number]  Unit/Bed#: [facility] 801-01 Encounter: [id number]      Objective:  Patient states he feels miserable  Patient not passing any flatus  Nausea throughout the evening, needing of Zofran almost around the clock  Complaining of reflux  Unable to state yesterday  Did ambulate the halls 3 times yesterday  Did take in clear liquids  Voiding well on his own since Escobedo catheter was removed  Since patient is not taking anything by mouth right now the IV fluids were increased from 50 mL to 84 mL/hour  950 UA supine concentrated    Blood pressure 125/76, pulse 78, temperature 97 5 °F (36 4 °C), resp  rate 18, height 5' 10" (1 778 m), weight 77 1 kg (170 lb), SpO2 97 %  ,Body mass index is 24 39 kg/m²  Intake/Output Summary (Last 24 hours) at 9/10/2020 0516  Last data filed at 9/10/2020 0505  Gross per 24 hour   Intake 2552 83 ml   Output 1450 ml   Net 1102 83 ml       Invasive Devices     Peripheral Intravenous Line            Peripheral IV 09/08/20 Left Hand 1 day    Peripheral IV 09/08/20 Right Antecubital 1 day                Physical Exam:   Chest:  Clear lungs bilaterally minimal bilateral expansion  Audible blowing systolic murmur heard best in the upper cardiac regions  Abdomen:  Firm, distended, hypoactive bowel sounds, small surgical wound is clean and dry trocar sites are clean and dry  Extremities: There is no calf tenderness, venadynes on and functioning    Lab, Imaging and other studies:  Pending  VTE Pharmacologic Prophylaxis: Heparin  VTE Mechanical Prophylaxis: sequential compression device    Assessment:  POD # 2 laparoscopic subtotal colectomy, liver biopsy, partial omentectomy    Plan:  1  ?  Nasogastric tube for the next 24 hours  2  Would keep NPO  3  Continue accurate I&Os  4  Continue ambulating the halls  5  Continue working with incentive spirometry  6  Continue the PCA for pain control  7  Check a m   Labs

## 2020-09-10 NOTE — PROGRESS NOTES
Telephone outreach to patient's daughter, Murray Felty, at the request of Dr Adeola Acosta  Introduced myself and my role  Reviewed with Murray Felty that her dad is going to need chemotherapy, which was previously discussed with him  To prepare for that, I provided her with an appointment for her dad to see Dr Brent Kuo on 10/1/20 at 11 AM at the [facility] location to discuss the chemotherapy recommendations  I answered her questions to the best of my ability on the call today  I provided her with my contact information and encouraged her to call with any questions or concerns

## 2020-09-10 NOTE — PHYSICAL THERAPY NOTE
Physical Therapy Treatment Note    Patient's Name: Popeye Roberts  : 1940       09/10/20 1043   PT Last Visit   PT Visit Date 09/10/20   Pain Assessment   Pain Assessment Tool 0-10   Pain Score 3   Pain Location/Orientation Orientation: Mid;Location: Abdomen   Hospital Pain Intervention(s) Repositioned; Ambulation/increased activity   Restrictions/Precautions   Weight Bearing Precautions Per Order No   Other Precautions Chair Alarm; Fall Risk;Pain;Multiple lines   General   Chart Reviewed Yes   Additional Pertinent History s/p NG tube placement, possible ileus per Dr Mirta Coulter   Family/Caregiver Present Yes   Cognition   Overall Cognitive Status [facility]   Arousal/Participation Alert; Cooperative   Attention Within functional limits   Orientation Level Oriented X4   Memory Within functional limits   Following Commands Follows all commands and directions without difficulty   Comments Pt uncofmortable with NG tube in place, agreeable to mobility  Bed Mobility   Supine to Sit 4  Minimal assistance   Additional items Assist x 1; Increased time required;Verbal cues   Additional Comments VC's for log rolling   Transfers   Sit to Stand 5  Supervision   Additional items Assist x 1;Verbal cues; Increased time required   Stand to Sit 5  Supervision   Additional items Assist x 1; Increased time required;Verbal cues   Additional Comments VC's for hand placement with RW   Ambulation/Elevation   Gait pattern Foward flexed; Excessively slow;Decreased foot clearance   Gait Assistance 5  Supervision   Additional items Assist x 1   Assistive Device Rolling walker   Distance 100 ft x2 trials, short standing rest break between trials  VC's for shoulder relaxation, erect posture with fatigue      Balance   Static Sitting Good   Dynamic Sitting Fair +   Static Standing Fair   Dynamic Standing Fair -   Ambulatory Fair -   Activity Tolerance   Activity Tolerance Patient limited by fatigue;Patient limited by pain   Nurse Made Aware RN updated  Chair alarm intact   Exercises   Hip Abduction Sitting;10 reps;AROM; Bilateral   Hip Adduction Sitting;10 reps;Bilateral;AROM   Knee AROM Long Arc Quad Sitting;10 reps;AROM; Bilateral   Ankle Pumps Sitting;20 reps;AROM; Bilateral  (heel/toe raise)   Assessment   Prognosis Good   Problem List Decreased strength;Decreased endurance; Impaired balance;Decreased mobility; Decreased safety awareness;Pain   Assessment NG tube placed this morning, pt with discomfort and pain in abdomen  Pt remains motivated to maintain function  Daughter present during session and very supportive  Discussed concerns over stair climbing, will attempt next session as pt able to tolerate  Pt with progressive flexed posture with fatigue, encouraged erect posture and will continue to decrease reliance on RW  Pt will benefit from continued skilled PT intervention during course of hospital stay to improve strength, endurance and balance to improve safety with functional mobility  Recommend home with family care upon hospital D/C  Goals   Patient Goals to walk   STG Expiration Date 09/19/20   PT Treatment Day 1   Plan   Treatment/Interventions Functional transfer training;LE strengthening/ROM; Elevations; Therapeutic exercise; Endurance training;Patient/family training;Equipment eval/education; Bed mobility;Gait training   Progress Progressing toward goals   PT Frequency Other (Comment)  (3-5x/week)   Recommendation   PT Discharge Recommendation Return to previous environment with social support  (home with family care)   PT - OK to Discharge No   Additional Comments abdon Hayes, PT, DPT

## 2020-09-10 NOTE — PLAN OF CARE
Problem: PHYSICAL THERAPY ADULT  Goal: Performs mobility at highest level of function for planned discharge setting  See evaluation for individualized goals  Description: Treatment/Interventions: Functional transfer training, LE strengthening/ROM, Elevations, Endurance training, Therapeutic exercise, Patient/family training, Equipment eval/education, Bed mobility, Gait training          See flowsheet documentation for full assessment, interventions and recommendations  Outcome: Progressing  Note: Prognosis: Good  Problem List: Decreased strength, Decreased endurance, Impaired balance, Decreased mobility, Decreased safety awareness, Pain  Assessment: NG tube placed this morning, pt with discomfort and pain in abdomen  Pt remains motivated to maintain function  Daughter present during session and very supportive  Discussed concerns over stair climbing, will attempt next session as pt able to tolerate  Pt with progressive flexed posture with fatigue, encouraged erect posture and will continue to decrease reliance on RW  Pt will benefit from continued skilled PT intervention during course of hospital stay to improve strength, endurance and balance to improve safety with functional mobility  Recommend home with family care upon hospital D/C  PT Discharge Recommendation: Return to previous environment with social support(home with family care)     PT - OK to Discharge: No    See flowsheet documentation for full assessment

## 2020-09-10 NOTE — TELEPHONE ENCOUNTER
Pt has active [facility] [employer] effective 01/01/19  pcp co-pay $10 specialist co-pay $35  ER co-pay $90  Called pt to go oer the co-pay info but got his voicemail   I left him a message to call me back

## 2020-09-11 LAB
ANION GAP SERPL CALCULATED.3IONS-SCNC: 4 MMOL/L (ref 4–13)
BASOPHILS # BLD AUTO: 0.03 THOUSANDS/ΜL (ref 0–0.1)
BASOPHILS NFR BLD AUTO: 0 % (ref 0–1)
BUN SERPL-MCNC: 11 MG/DL (ref 5–25)
CALCIUM SERPL-MCNC: 8.3 MG/DL (ref 8.3–10.1)
CHLORIDE SERPL-SCNC: 105 MMOL/L (ref 100–108)
CO2 SERPL-SCNC: 29 MMOL/L (ref 21–32)
CREAT SERPL-MCNC: 0.8 MG/DL (ref 0.6–1.3)
EOSINOPHIL # BLD AUTO: 0.11 THOUSAND/ΜL (ref 0–0.61)
EOSINOPHIL NFR BLD AUTO: 1 % (ref 0–6)
ERYTHROCYTE [DISTWIDTH] IN BLOOD BY AUTOMATED COUNT: 13.2 % (ref 11.6–15.1)
GFR SERPL CREATININE-BSD FRML MDRD: 84 ML/MIN/1.73SQ M
GLUCOSE SERPL-MCNC: 137 MG/DL (ref 65–140)
HCT VFR BLD AUTO: 33.8 % (ref 36.5–49.3)
HGB BLD-MCNC: 10.9 G/DL (ref 12–17)
IMM GRANULOCYTES # BLD AUTO: 0.05 THOUSAND/UL (ref 0–0.2)
IMM GRANULOCYTES NFR BLD AUTO: 0 % (ref 0–2)
LYMPHOCYTES # BLD AUTO: 0.81 THOUSANDS/ΜL (ref 0.6–4.47)
LYMPHOCYTES NFR BLD AUTO: 7 % (ref 14–44)
MAGNESIUM SERPL-MCNC: 2.4 MG/DL (ref 1.6–2.6)
MCH RBC QN AUTO: 28.5 PG (ref 26.8–34.3)
MCHC RBC AUTO-ENTMCNC: 32.2 G/DL (ref 31.4–37.4)
MCV RBC AUTO: 88 FL (ref 82–98)
MONOCYTES # BLD AUTO: 1.02 THOUSAND/ΜL (ref 0.17–1.22)
MONOCYTES NFR BLD AUTO: 9 % (ref 4–12)
NEUTROPHILS # BLD AUTO: 9.77 THOUSANDS/ΜL (ref 1.85–7.62)
NEUTS SEG NFR BLD AUTO: 83 % (ref 43–75)
NRBC BLD AUTO-RTO: 0 /100 WBCS
PHOSPHATE SERPL-MCNC: 1.9 MG/DL (ref 2.3–4.1)
PLATELET # BLD AUTO: 307 THOUSANDS/UL (ref 149–390)
PMV BLD AUTO: 10.4 FL (ref 8.9–12.7)
POTASSIUM SERPL-SCNC: 4.3 MMOL/L (ref 3.5–5.3)
RBC # BLD AUTO: 3.83 MILLION/UL (ref 3.88–5.62)
SODIUM SERPL-SCNC: 138 MMOL/L (ref 136–145)
WBC # BLD AUTO: 11.79 THOUSAND/UL (ref 4.31–10.16)

## 2020-09-11 PROCEDURE — 80048 BASIC METABOLIC PNL TOTAL CA: CPT | Performed by: SURGERY

## 2020-09-11 PROCEDURE — 84100 ASSAY OF PHOSPHORUS: CPT | Performed by: PHYSICIAN ASSISTANT

## 2020-09-11 PROCEDURE — 85025 COMPLETE CBC W/AUTO DIFF WBC: CPT | Performed by: SURGERY

## 2020-09-11 PROCEDURE — 83735 ASSAY OF MAGNESIUM: CPT | Performed by: PHYSICIAN ASSISTANT

## 2020-09-11 RX ORDER — TAMSULOSIN HYDROCHLORIDE 0.4 MG/1
0.4 CAPSULE ORAL
Status: DISCONTINUED | OUTPATIENT
Start: 2020-09-11 | End: 2020-09-14 | Stop reason: HOSPADM

## 2020-09-11 RX ADMIN — HEPARIN SODIUM 5000 UNITS: 5000 INJECTION INTRAVENOUS; SUBCUTANEOUS at 05:06

## 2020-09-11 RX ADMIN — METOCLOPRAMIDE 10 MG: 5 INJECTION, SOLUTION INTRAMUSCULAR; INTRAVENOUS at 23:12

## 2020-09-11 RX ADMIN — LATANOPROST 1 DROP: 50 SOLUTION OPHTHALMIC at 21:26

## 2020-09-11 RX ADMIN — METOCLOPRAMIDE 10 MG: 5 INJECTION, SOLUTION INTRAMUSCULAR; INTRAVENOUS at 00:11

## 2020-09-11 RX ADMIN — METOCLOPRAMIDE 10 MG: 5 INJECTION, SOLUTION INTRAMUSCULAR; INTRAVENOUS at 14:33

## 2020-09-11 RX ADMIN — DEXTROSE, SODIUM CHLORIDE, AND POTASSIUM CHLORIDE 100 ML/HR: 5; .45; .15 INJECTION INTRAVENOUS at 15:24

## 2020-09-11 RX ADMIN — DEXTROSE, SODIUM CHLORIDE, AND POTASSIUM CHLORIDE 100 ML/HR: 5; .45; .15 INJECTION INTRAVENOUS at 05:10

## 2020-09-11 RX ADMIN — SODIUM PHOSPHATE, MONOBASIC, MONOHYDRATE 21 MMOL: 276; 142 INJECTION, SOLUTION INTRAVENOUS at 12:59

## 2020-09-11 RX ADMIN — HEPARIN SODIUM 5000 UNITS: 5000 INJECTION INTRAVENOUS; SUBCUTANEOUS at 21:26

## 2020-09-11 RX ADMIN — TIMOLOL MALEATE 1 DROP: 2.5 SOLUTION/ DROPS OPHTHALMIC at 08:35

## 2020-09-11 RX ADMIN — HEPARIN SODIUM 5000 UNITS: 5000 INJECTION INTRAVENOUS; SUBCUTANEOUS at 14:22

## 2020-09-11 RX ADMIN — METOCLOPRAMIDE 10 MG: 5 INJECTION, SOLUTION INTRAMUSCULAR; INTRAVENOUS at 05:06

## 2020-09-11 RX ADMIN — METOCLOPRAMIDE 10 MG: 5 INJECTION, SOLUTION INTRAMUSCULAR; INTRAVENOUS at 17:20

## 2020-09-11 NOTE — NURSING NOTE
Patient sitting upright in chair, with c/o 2 out of 10 pain  NG tube flushed and auscultated  NG tube at low-continuous suction  Vital signs are stable  Will continue to monitor    Bo Rouse RN done

## 2020-09-11 NOTE — QUICK NOTE
Called to bedside to evaluate patient  V S S, appropriate post-operative pain, OOB and ambulating in hallways  NGT output 500mL output since 3PM, bilious, however not documented in chart  Patient has been urinating in toilet and flushing, accurate UO not applicable  -Asked nurse to place hat in toilet for accurate UO assesment  -Bladder scanned for 130 this afternoon, not retention  -D51/2 +K20 @100mL    PLEASE DOCUMENT I/Os      Yaneth Quintero PA-C

## 2020-09-11 NOTE — RESTORATIVE TECHNICIAN NOTE
Restorative Specialist Mobility Note       Activity: Ambulate in sparks, [place] privileges, Chair     Assistive Device: Front wheel walker

## 2020-09-11 NOTE — PROGRESS NOTES
Progress Note - Colorectal Surgery   Siddharth Adame 79 y o  male MRN: [id number]  Unit/Bed#: [facility] 801-01 Encounter: [id number]    Assessment:  79 y o  male 3 Days Post-Op s/p Procedure(s) (LRB):  COLECTOMY, SUBTOTAL LAPAROSCOPIC/HANDPORT ASSISTED (Left)    NG output 1 2L 400ml for the last 12hours  +ve flartus and BM but still abdomen is distended    Plan:  Diet Surgical; NPO   Maintain NG for now  OOBTC, AAT  Pulmonary Toilet, IS  PPx: SQH, PPI, Colace  Pain and Nausea control PRN     Subjective/Objective     Subjective:   Denies pain, nausea or vomiting, having flatus and had BM yesterday, ambulating    Objective:    Blood pressure 130/68, pulse (!) 107, temperature 99 4 °F (37 4 °C), resp  rate 18, height 5' 10" (1 778 m), weight 77 1 kg (170 lb), SpO2 94 %  ,Body mass index is 24 39 kg/m²        Intake/Output Summary (Last 24 hours) at 9/11/2020 0612  Last data filed at 9/11/2020 0509  Gross per 24 hour   Intake 2898 17 ml   Output 1800 ml   Net 1098 17 ml       Invasive Devices     Peripheral Intravenous Line            Peripheral IV 09/08/20 Left Hand 2 days          Drain            NG/OG/Enteral Tube Nasogastric Right nares less than 1 day                Physical Exam:   Gen:  NAD  CV:  warm, well-perfused  Lungs: nl effort  Abd:  soft, NT, distended, incision cdi  Ext:  no CCE  Neuro: A&Ox3     Results from last 7 days   Lab Units 09/10/20  0444 09/09/20  0438 09/08/20  0457   WBC Thousand/uL 14 45* 9 98 11 40*   HEMOGLOBIN g/dL 11 8* 12 1 12 3   HEMATOCRIT % 36 6 38 1 37 6   PLATELETS Thousands/uL 358 277 289     Results from last 7 days   Lab Units 09/10/20  0444 09/09/20  0438 09/08/20  0457   POTASSIUM mmol/L 3 7 4 6 4 2   CHLORIDE mmol/L 103 106 105   CO2 mmol/L 30 25 26   BUN mg/dL 14 9 12   CREATININE mg/dL 0 84 0 84 0 85   CALCIUM mg/dL 8 9 8 5 9 0

## 2020-09-12 LAB
ANION GAP SERPL CALCULATED.3IONS-SCNC: 5 MMOL/L (ref 4–13)
BASOPHILS # BLD AUTO: 0.03 THOUSANDS/ΜL (ref 0–0.1)
BASOPHILS NFR BLD AUTO: 0 % (ref 0–1)
BUN SERPL-MCNC: 9 MG/DL (ref 5–25)
CALCIUM SERPL-MCNC: 8.4 MG/DL (ref 8.3–10.1)
CHLORIDE SERPL-SCNC: 103 MMOL/L (ref 100–108)
CO2 SERPL-SCNC: 29 MMOL/L (ref 21–32)
CREAT SERPL-MCNC: 0.62 MG/DL (ref 0.6–1.3)
EOSINOPHIL # BLD AUTO: 0.52 THOUSAND/ΜL (ref 0–0.61)
EOSINOPHIL NFR BLD AUTO: 6 % (ref 0–6)
ERYTHROCYTE [DISTWIDTH] IN BLOOD BY AUTOMATED COUNT: 13 % (ref 11.6–15.1)
GFR SERPL CREATININE-BSD FRML MDRD: 93 ML/MIN/1.73SQ M
GLUCOSE SERPL-MCNC: 117 MG/DL (ref 65–140)
HCT VFR BLD AUTO: 33.4 % (ref 36.5–49.3)
HGB BLD-MCNC: 10.6 G/DL (ref 12–17)
IMM GRANULOCYTES # BLD AUTO: 0.04 THOUSAND/UL (ref 0–0.2)
IMM GRANULOCYTES NFR BLD AUTO: 0 % (ref 0–2)
LYMPHOCYTES # BLD AUTO: 0.67 THOUSANDS/ΜL (ref 0.6–4.47)
LYMPHOCYTES NFR BLD AUTO: 7 % (ref 14–44)
MAGNESIUM SERPL-MCNC: 2 MG/DL (ref 1.6–2.6)
MCH RBC QN AUTO: 28 PG (ref 26.8–34.3)
MCHC RBC AUTO-ENTMCNC: 31.7 G/DL (ref 31.4–37.4)
MCV RBC AUTO: 88 FL (ref 82–98)
MONOCYTES # BLD AUTO: 0.74 THOUSAND/ΜL (ref 0.17–1.22)
MONOCYTES NFR BLD AUTO: 8 % (ref 4–12)
NEUTROPHILS # BLD AUTO: 7.49 THOUSANDS/ΜL (ref 1.85–7.62)
NEUTS SEG NFR BLD AUTO: 79 % (ref 43–75)
NRBC BLD AUTO-RTO: 0 /100 WBCS
PHOSPHATE SERPL-MCNC: 2.5 MG/DL (ref 2.3–4.1)
PLATELET # BLD AUTO: 280 THOUSANDS/UL (ref 149–390)
PMV BLD AUTO: 10.1 FL (ref 8.9–12.7)
POTASSIUM SERPL-SCNC: 4.1 MMOL/L (ref 3.5–5.3)
RBC # BLD AUTO: 3.79 MILLION/UL (ref 3.88–5.62)
SODIUM SERPL-SCNC: 137 MMOL/L (ref 136–145)
WBC # BLD AUTO: 9.49 THOUSAND/UL (ref 4.31–10.16)

## 2020-09-12 PROCEDURE — 84100 ASSAY OF PHOSPHORUS: CPT | Performed by: PHYSICIAN ASSISTANT

## 2020-09-12 PROCEDURE — 80048 BASIC METABOLIC PNL TOTAL CA: CPT | Performed by: PHYSICIAN ASSISTANT

## 2020-09-12 PROCEDURE — 83735 ASSAY OF MAGNESIUM: CPT | Performed by: PHYSICIAN ASSISTANT

## 2020-09-12 PROCEDURE — 85025 COMPLETE CBC W/AUTO DIFF WBC: CPT | Performed by: PHYSICIAN ASSISTANT

## 2020-09-12 RX ORDER — OXYCODONE HYDROCHLORIDE 5 MG/1
5 TABLET ORAL EVERY 4 HOURS PRN
Status: DISCONTINUED | OUTPATIENT
Start: 2020-09-12 | End: 2020-09-14 | Stop reason: HOSPADM

## 2020-09-12 RX ORDER — OXYCODONE HYDROCHLORIDE 10 MG/1
10 TABLET ORAL EVERY 4 HOURS PRN
Status: DISCONTINUED | OUTPATIENT
Start: 2020-09-12 | End: 2020-09-14 | Stop reason: HOSPADM

## 2020-09-12 RX ORDER — HYDROMORPHONE HCL/PF 1 MG/ML
0.5 SYRINGE (ML) INJECTION
Status: DISCONTINUED | OUTPATIENT
Start: 2020-09-12 | End: 2020-09-14 | Stop reason: HOSPADM

## 2020-09-12 RX ADMIN — LATANOPROST 1 DROP: 50 SOLUTION OPHTHALMIC at 22:38

## 2020-09-12 RX ADMIN — DEXTROSE, SODIUM CHLORIDE, AND POTASSIUM CHLORIDE 75 ML/HR: 5; .45; .15 INJECTION INTRAVENOUS at 13:02

## 2020-09-12 RX ADMIN — DEXTROSE, SODIUM CHLORIDE, AND POTASSIUM CHLORIDE 100 ML/HR: 5; .45; .15 INJECTION INTRAVENOUS at 02:26

## 2020-09-12 RX ADMIN — HEPARIN SODIUM 5000 UNITS: 5000 INJECTION INTRAVENOUS; SUBCUTANEOUS at 13:02

## 2020-09-12 RX ADMIN — HEPARIN SODIUM 5000 UNITS: 5000 INJECTION INTRAVENOUS; SUBCUTANEOUS at 22:38

## 2020-09-12 RX ADMIN — TIMOLOL MALEATE 1 DROP: 2.5 SOLUTION/ DROPS OPHTHALMIC at 08:23

## 2020-09-12 RX ADMIN — HEPARIN SODIUM 5000 UNITS: 5000 INJECTION INTRAVENOUS; SUBCUTANEOUS at 05:46

## 2020-09-12 NOTE — PLAN OF CARE
Problem: GASTROINTESTINAL - ADULT  Goal: Minimal or absence of nausea and/or vomiting  Description: INTERVENTIONS:  - Administer IV fluids if ordered to ensure adequate hydration  - Maintain NPO status until nausea and vomiting are resolved  - Nasogastric tube if ordered  - Administer ordered antiemetic medications as needed  - Provide nonpharmacologic comfort measures as appropriate  - Advance diet as tolerated, if ordered  - Consider nutrition services referral to assist patient with adequate nutrition and appropriate food choices  Outcome: Progressing  Goal: Maintains or returns to baseline bowel function  Description: INTERVENTIONS:  - Assess bowel function  - Encourage oral fluids to ensure adequate hydration  - Administer IV fluids if ordered to ensure adequate hydration  - Administer ordered medications as needed  - Encourage mobilization and activity  - Consider nutritional services referral to assist patient with adequate nutrition and appropriate food choices  Outcome: Progressing  Goal: Maintains adequate nutritional intake  Description: INTERVENTIONS:  - Monitor percentage of each meal consumed  - Identify factors contributing to decreased intake, treat as appropriate  - Assist with meals as needed  - Monitor I&O, weight, and lab values if indicated  - Obtain nutrition services referral as needed  Outcome: Progressing     Problem: METABOLIC, FLUID AND ELECTROLYTES - ADULT  Goal: Electrolytes maintained within normal limits  Description: INTERVENTIONS:  - Monitor labs and assess patient for signs and symptoms of electrolyte imbalances  - Administer electrolyte replacement as ordered  - Monitor response to electrolyte replacements, including repeat lab results as appropriate  - Instruct patient on fluid and nutrition as appropriate  Outcome: Progressing     Problem: SKIN/TISSUE INTEGRITY - ADULT  Goal: Incision(s), wounds(s) or drain site(s) healing without S/S of infection  Description: INTERVENTIONS  - Assess and document risk factors for skin impairment   - Assess and document dressing, incision, wound bed, drain sites and surrounding tissue  - Consider nutrition services referral as needed  - Oral mucous membranes remain intact  - Provide patient/ family education  Outcome: Progressing     Problem: PAIN - ADULT  Goal: Verbalizes/displays adequate comfort level or baseline comfort level  Description: Interventions:  - Encourage patient to monitor pain and request assistance  - Assess pain using appropriate pain scale  - Administer analgesics based on type and severity of pain and evaluate response  - Implement non-pharmacological measures as appropriate and evaluate response  - Consider cultural and social influences on pain and pain management  - Notify physician/advanced practitioner if interventions unsuccessful or patient reports new pain  Outcome: Progressing     Problem: INFECTION - ADULT  Goal: Absence or prevention of progression during hospitalization  Description: INTERVENTIONS:  - Assess and monitor for signs and symptoms of infection  - Monitor lab/diagnostic results  - Monitor all insertion sites, i e  indwelling lines, tubes, and drains  - Monitor endotracheal if appropriate and nasal secretions for changes in amount and color  - [place] appropriate cooling/warming therapies per order  - Administer medications as ordered  - Instruct and encourage patient and family to use good hand hygiene technique  - Identify and instruct in appropriate isolation precautions for identified infection/condition  Outcome: Progressing     Problem: SAFETY ADULT  Goal: Patient will remain free of falls  Description: INTERVENTIONS:  - Assess patient frequently for physical needs  -  Identify cognitive and physical deficits and behaviors that affect risk of falls    -  [place] fall precautions as indicated by assessment   - Educate patient/family on patient safety including physical limitations  - Instruct patient to call for assistance with activity based on assessment  - Modify environment to reduce risk of injury  - Consider OT/PT consult to assist with strengthening/mobility  Outcome: Progressing  Goal: Maintain or return to baseline ADL function  Description: INTERVENTIONS:  -  Assess patient's ability to carry out ADLs; assess patient's baseline for ADL function and identify physical deficits which impact ability to perform ADLs (bathing, care of mouth/teeth, toileting, grooming, dressing, etc )  - Assess/evaluate cause of self-care deficits   - Assess range of motion  - Assess patient's mobility; develop plan if impaired  - Assess patient's need for assistive devices and provide as appropriate  - Encourage maximum independence but intervene and supervise when necessary  - Involve family in performance of ADLs  - Assess for home care needs following discharge   - Consider OT consult to assist with ADL evaluation and planning for discharge  - Provide patient education as appropriate  Outcome: Progressing  Goal: Maintain or return mobility status to optimal level  Description: INTERVENTIONS:  - Assess patient's baseline mobility status (ambulation, transfers, stairs, etc )    - Identify cognitive and physical deficits and behaviors that affect mobility  - Identify mobility aids required to assist with transfers and/or ambulation (gait belt, sit-to-stand, lift, walker, cane, etc )  - [place] fall precautions as indicated by assessment  - Record patient progress and toleration of activity level on Mobility SBAR; progress patient to next Phase/Stage  - Instruct patient to call for assistance with activity based on assessment  - Consider rehabilitation consult to assist with strengthening/weightbearing, etc   Outcome: Progressing     Problem: DISCHARGE PLANNING  Goal: Discharge to home or other facility with appropriate resources  Description: INTERVENTIONS:  - Identify barriers to discharge w/patient and caregiver  - Arrange for needed discharge resources and transportation as appropriate  - Identify discharge learning needs (meds, wound care, etc )  - Arrange for interpretive services to assist at discharge as needed  - Refer to Case Management Department for coordinating discharge planning if the patient needs post-hospital services based on physician/advanced practitioner order or complex needs related to functional status, cognitive ability, or social support system  Outcome: Progressing     Problem: Knowledge Deficit  Goal: Patient/family/caregiver demonstrates understanding of disease process, treatment plan, medications, and discharge instructions  Description: Complete learning assessment and assess knowledge base  Interventions:  - Provide teaching at level of understanding  - Provide teaching via preferred learning methods  Outcome: Progressing     Problem: Potential for Falls  Goal: Patient will remain free of falls  Description: INTERVENTIONS:  - Assess patient frequently for physical needs  -  Identify cognitive and physical deficits and behaviors that affect risk of falls    -  [place] fall precautions as indicated by assessment   - Educate patient/family on patient safety including physical limitations  - Instruct patient to call for assistance with activity based on assessment  - Modify environment to reduce risk of injury  - Consider OT/PT consult to assist with strengthening/mobility  Outcome: Progressing     Problem: Prexisting or High Potential for Compromised Skin Integrity  Goal: Skin integrity is maintained or improved  Description: INTERVENTIONS:  - Identify patients at risk for skin breakdown  - Assess and monitor skin integrity  - Assess and monitor nutrition and hydration status  - Monitor labs   - Assess for incontinence   - Turn and reposition patient  - Assist with mobility/ambulation  - Relieve pressure over bony prominences  - Avoid friction and shearing  - Provide appropriate hygiene as needed including keeping skin clean and dry  - Evaluate need for skin moisturizer/barrier cream  - Collaborate with interdisciplinary team   - Patient/family teaching  - Consider wound care consult   Outcome: Progressing     Problem: Nutrition/Hydration-ADULT  Goal: Nutrient/Hydration intake appropriate for improving, restoring or maintaining nutritional needs  Description: Monitor and assess patient's nutrition/hydration status for malnutrition  Collaborate with interdisciplinary team and initiate plan and interventions as ordered  Monitor patient's weight and dietary intake as ordered or per policy  Utilize nutrition screening tool and intervene as necessary  Determine patient's food preferences and provide high-protein, high-caloric foods as appropriate       INTERVENTIONS:  - Monitor oral intake, urinary output, labs, and treatment plans  - Assess nutrition and hydration status and recommend course of action  - Evaluate amount of meals eaten  - Assist patient with eating if necessary   - Allow adequate time for meals  - Recommend/ encourage appropriate diets, oral nutritional supplements, and vitamin/mineral supplements  - Order, calculate, and assess calorie counts as needed  - Recommend, monitor, and adjust tube feedings and TPN/PPN based on assessed needs  - Assess need for intravenous fluids  - Provide specific nutrition/hydration education as appropriate  - Include patient/family/caregiver in decisions related to nutrition  Outcome: Progressing

## 2020-09-12 NOTE — PROGRESS NOTES
Progress Note -    Randa Vinson 79 y o  male MRN: [id number]  Unit/Bed#: [facility] 801-01 Encounter: [id number]    Assessment:  79 y o  male 4 Days Post-Op s/p Procedure(s) (LRB):  COLECTOMY, SUBTOTAL LAPAROSCOPIC/HANDPORT ASSISTED (Left)    Remains hemodynamically stable  Passing flatus and had a BM yesterday evening  NG output: 850 cc  Abdomen mildly distended (less than yesterday)  PLAN:  - we will consider NG clamp trial today  - possibly discontinue PCA  '  - PT/OT  - incentive spirometry  Subjective:   - no new complaints  Objective:     Vitals: Temp:  [97 7 °F (36 5 °C)-99 1 °F (37 3 °C)] 98 3 °F (36 8 °C)  HR:  [84-92] 92  Resp:  [16-18] 16  BP: (123-160)/() 141/92  Body mass index is 24 39 kg/m²  I/O       09/10 0701 - 09/11 0700 09/11 0701 - 09/12 0700    P  O  0 0    I V  (mL/kg) 2758 2 (35 8) 1486 2 (19 3)    NG/GT 40 70    IV Piggyback 100     Total Intake(mL/kg) 2898 2 (37 6) 1556 2 (20 2)    Urine (mL/kg/hr) 550 (0 3) 1200 (0 6)    Emesis/NG output 1250 950    Stool 0     Total Output 1800 2150    Net +1098 2 -593 8          Unmeasured Urine Occurrence 1 x 1 x          Physical Exam:  GEN: NAD; NGT in situ  HEENT: MMM  CV: RRR  Lung: Normal effort  Ab: Soft, mildly distended/tympanitic in some areas, appropriately tender to palpation; incisions clean, dry and intact  Extrem: No CCE  Neuro: A+Ox3    Lab, Imaging and other studies: I have personally reviewed pertinent reports      VTE Pharmacologic Prophylaxis: Sequential compression device (Venodyne)  and Heparin  VTE Mechanical Prophylaxis: sequential compression device

## 2020-09-13 LAB
ANION GAP SERPL CALCULATED.3IONS-SCNC: 6 MMOL/L (ref 4–13)
BUN SERPL-MCNC: 7 MG/DL (ref 5–25)
CALCIUM SERPL-MCNC: 8.8 MG/DL (ref 8.3–10.1)
CHLORIDE SERPL-SCNC: 103 MMOL/L (ref 100–108)
CO2 SERPL-SCNC: 26 MMOL/L (ref 21–32)
CREAT SERPL-MCNC: 0.6 MG/DL (ref 0.6–1.3)
ERYTHROCYTE [DISTWIDTH] IN BLOOD BY AUTOMATED COUNT: 12.9 % (ref 11.6–15.1)
GFR SERPL CREATININE-BSD FRML MDRD: 94 ML/MIN/1.73SQ M
GLUCOSE SERPL-MCNC: 121 MG/DL (ref 65–140)
HCT VFR BLD AUTO: 34.3 % (ref 36.5–49.3)
HGB BLD-MCNC: 11.3 G/DL (ref 12–17)
MAGNESIUM SERPL-MCNC: 2.3 MG/DL (ref 1.6–2.6)
MCH RBC QN AUTO: 28.3 PG (ref 26.8–34.3)
MCHC RBC AUTO-ENTMCNC: 32.9 G/DL (ref 31.4–37.4)
MCV RBC AUTO: 86 FL (ref 82–98)
PHOSPHATE SERPL-MCNC: 3 MG/DL (ref 2.3–4.1)
PLATELET # BLD AUTO: 321 THOUSANDS/UL (ref 149–390)
PMV BLD AUTO: 9.7 FL (ref 8.9–12.7)
POTASSIUM SERPL-SCNC: 4.2 MMOL/L (ref 3.5–5.3)
RBC # BLD AUTO: 3.99 MILLION/UL (ref 3.88–5.62)
SODIUM SERPL-SCNC: 135 MMOL/L (ref 136–145)
WBC # BLD AUTO: 9.09 THOUSAND/UL (ref 4.31–10.16)

## 2020-09-13 PROCEDURE — 80048 BASIC METABOLIC PNL TOTAL CA: CPT | Performed by: SURGERY

## 2020-09-13 PROCEDURE — 85027 COMPLETE CBC AUTOMATED: CPT | Performed by: SURGERY

## 2020-09-13 PROCEDURE — 83735 ASSAY OF MAGNESIUM: CPT | Performed by: SURGERY

## 2020-09-13 PROCEDURE — 84100 ASSAY OF PHOSPHORUS: CPT | Performed by: SURGERY

## 2020-09-13 RX ORDER — METOCLOPRAMIDE HYDROCHLORIDE 5 MG/ML
10 INJECTION INTRAMUSCULAR; INTRAVENOUS EVERY 6 HOURS PRN
Status: DISCONTINUED | OUTPATIENT
Start: 2020-09-13 | End: 2020-09-14 | Stop reason: HOSPADM

## 2020-09-13 RX ORDER — ONDANSETRON 2 MG/ML
4 INJECTION INTRAMUSCULAR; INTRAVENOUS EVERY 4 HOURS PRN
Status: DISCONTINUED | OUTPATIENT
Start: 2020-09-13 | End: 2020-09-14 | Stop reason: HOSPADM

## 2020-09-13 RX ADMIN — DEXTROSE, SODIUM CHLORIDE, AND POTASSIUM CHLORIDE 75 ML/HR: 5; .45; .15 INJECTION INTRAVENOUS at 02:51

## 2020-09-13 RX ADMIN — ONDANSETRON 4 MG: 2 INJECTION INTRAMUSCULAR; INTRAVENOUS at 20:08

## 2020-09-13 RX ADMIN — TIMOLOL MALEATE 1 DROP: 2.5 SOLUTION/ DROPS OPHTHALMIC at 07:55

## 2020-09-13 RX ADMIN — ONDANSETRON 4 MG: 2 INJECTION INTRAMUSCULAR; INTRAVENOUS at 15:57

## 2020-09-13 RX ADMIN — HEPARIN SODIUM 5000 UNITS: 5000 INJECTION INTRAVENOUS; SUBCUTANEOUS at 13:05

## 2020-09-13 RX ADMIN — HEPARIN SODIUM 5000 UNITS: 5000 INJECTION INTRAVENOUS; SUBCUTANEOUS at 05:14

## 2020-09-13 RX ADMIN — LATANOPROST 1 DROP: 50 SOLUTION OPHTHALMIC at 21:02

## 2020-09-13 RX ADMIN — HEPARIN SODIUM 5000 UNITS: 5000 INJECTION INTRAVENOUS; SUBCUTANEOUS at 21:02

## 2020-09-13 NOTE — RESTORATIVE TECHNICIAN NOTE
Restorative Specialist Mobility Note       Activity: (offered to amb pt; pt states he already walked twice today with his daughter and they will walk another two times later today)

## 2020-09-13 NOTE — PLAN OF CARE
Problem: GASTROINTESTINAL - ADULT  Goal: Minimal or absence of nausea and/or vomiting  Description: INTERVENTIONS:  - Administer IV fluids if ordered to ensure adequate hydration  - Maintain NPO status until nausea and vomiting are resolved  - Nasogastric tube if ordered  - Administer ordered antiemetic medications as needed  - Provide nonpharmacologic comfort measures as appropriate  - Advance diet as tolerated, if ordered  - Consider nutrition services referral to assist patient with adequate nutrition and appropriate food choices  Outcome: Progressing  Goal: Maintains or returns to baseline bowel function  Description: INTERVENTIONS:  - Assess bowel function  - Encourage oral fluids to ensure adequate hydration  - Administer IV fluids if ordered to ensure adequate hydration  - Administer ordered medications as needed  - Encourage mobilization and activity  - Consider nutritional services referral to assist patient with adequate nutrition and appropriate food choices  Outcome: Progressing  Goal: Maintains adequate nutritional intake  Description: INTERVENTIONS:  - Monitor percentage of each meal consumed  - Identify factors contributing to decreased intake, treat as appropriate  - Assist with meals as needed  - Monitor I&O, weight, and lab values if indicated  - Obtain nutrition services referral as needed  Outcome: Progressing     Problem: METABOLIC, FLUID AND ELECTROLYTES - ADULT  Goal: Electrolytes maintained within normal limits  Description: INTERVENTIONS:  - Monitor labs and assess patient for signs and symptoms of electrolyte imbalances  - Administer electrolyte replacement as ordered  - Monitor response to electrolyte replacements, including repeat lab results as appropriate  - Instruct patient on fluid and nutrition as appropriate  Outcome: Progressing     Problem: SKIN/TISSUE INTEGRITY - ADULT  Goal: Incision(s), wounds(s) or drain site(s) healing without S/S of infection  Description: INTERVENTIONS  - Assess and document risk factors for skin impairment   - Assess and document dressing, incision, wound bed, drain sites and surrounding tissue  - Consider nutrition services referral as needed  - Oral mucous membranes remain intact  - Provide patient/ family education  Outcome: Progressing     Problem: PAIN - ADULT  Goal: Verbalizes/displays adequate comfort level or baseline comfort level  Description: Interventions:  - Encourage patient to monitor pain and request assistance  - Assess pain using appropriate pain scale  - Administer analgesics based on type and severity of pain and evaluate response  - Implement non-pharmacological measures as appropriate and evaluate response  - Consider cultural and social influences on pain and pain management  - Notify physician/advanced practitioner if interventions unsuccessful or patient reports new pain  Outcome: Progressing     Problem: INFECTION - ADULT  Goal: Absence or prevention of progression during hospitalization  Description: INTERVENTIONS:  - Assess and monitor for signs and symptoms of infection  - Monitor lab/diagnostic results  - Monitor all insertion sites, i e  indwelling lines, tubes, and drains  - Monitor endotracheal if appropriate and nasal secretions for changes in amount and color  - [place] appropriate cooling/warming therapies per order  - Administer medications as ordered  - Instruct and encourage patient and family to use good hand hygiene technique  - Identify and instruct in appropriate isolation precautions for identified infection/condition  Outcome: Progressing     Problem: SAFETY ADULT  Goal: Patient will remain free of falls  Description: INTERVENTIONS:  - Assess patient frequently for physical needs  -  Identify cognitive and physical deficits and behaviors that affect risk of falls    -  [place] fall precautions as indicated by assessment   - Educate patient/family on patient safety including physical limitations  - Instruct patient to call for assistance with activity based on assessment  - Modify environment to reduce risk of injury  - Consider OT/PT consult to assist with strengthening/mobility  Outcome: Progressing  Goal: Maintain or return to baseline ADL function  Description: INTERVENTIONS:  -  Assess patient's ability to carry out ADLs; assess patient's baseline for ADL function and identify physical deficits which impact ability to perform ADLs (bathing, care of mouth/teeth, toileting, grooming, dressing, etc )  - Assess/evaluate cause of self-care deficits   - Assess range of motion  - Assess patient's mobility; develop plan if impaired  - Assess patient's need for assistive devices and provide as appropriate  - Encourage maximum independence but intervene and supervise when necessary  - Involve family in performance of ADLs  - Assess for home care needs following discharge   - Consider OT consult to assist with ADL evaluation and planning for discharge  - Provide patient education as appropriate  Outcome: Progressing  Goal: Maintain or return mobility status to optimal level  Description: INTERVENTIONS:  - Assess patient's baseline mobility status (ambulation, transfers, stairs, etc )    - Identify cognitive and physical deficits and behaviors that affect mobility  - Identify mobility aids required to assist with transfers and/or ambulation (gait belt, sit-to-stand, lift, walker, cane, etc )  - [place] fall precautions as indicated by assessment  - Record patient progress and toleration of activity level on Mobility SBAR; progress patient to next Phase/Stage  - Instruct patient to call for assistance with activity based on assessment  - Consider rehabilitation consult to assist with strengthening/weightbearing, etc   Outcome: Progressing     Problem: DISCHARGE PLANNING  Goal: Discharge to home or other facility with appropriate resources  Description: INTERVENTIONS:  - Identify barriers to discharge w/patient and caregiver  - Arrange for needed discharge resources and transportation as appropriate  - Identify discharge learning needs (meds, wound care, etc )  - Arrange for interpretive services to assist at discharge as needed  - Refer to Case Management Department for coordinating discharge planning if the patient needs post-hospital services based on physician/advanced practitioner order or complex needs related to functional status, cognitive ability, or social support system  Outcome: Progressing     Problem: Knowledge Deficit  Goal: Patient/family/caregiver demonstrates understanding of disease process, treatment plan, medications, and discharge instructions  Description: Complete learning assessment and assess knowledge base  Interventions:  - Provide teaching at level of understanding  - Provide teaching via preferred learning methods  Outcome: Progressing     Problem: Potential for Falls  Goal: Patient will remain free of falls  Description: INTERVENTIONS:  - Assess patient frequently for physical needs  -  Identify cognitive and physical deficits and behaviors that affect risk of falls    -  [place] fall precautions as indicated by assessment   - Educate patient/family on patient safety including physical limitations  - Instruct patient to call for assistance with activity based on assessment  - Modify environment to reduce risk of injury  - Consider OT/PT consult to assist with strengthening/mobility  Outcome: Progressing     Problem: Prexisting or High Potential for Compromised Skin Integrity  Goal: Skin integrity is maintained or improved  Description: INTERVENTIONS:  - Identify patients at risk for skin breakdown  - Assess and monitor skin integrity  - Assess and monitor nutrition and hydration status  - Monitor labs   - Assess for incontinence   - Turn and reposition patient  - Assist with mobility/ambulation  - Relieve pressure over bony prominences  - Avoid friction and shearing  - Provide appropriate hygiene as needed including keeping skin clean and dry  - Evaluate need for skin moisturizer/barrier cream  - Collaborate with interdisciplinary team   - Patient/family teaching  - Consider wound care consult   Outcome: Progressing     Problem: Nutrition/Hydration-ADULT  Goal: Nutrient/Hydration intake appropriate for improving, restoring or maintaining nutritional needs  Description: Monitor and assess patient's nutrition/hydration status for malnutrition  Collaborate with interdisciplinary team and initiate plan and interventions as ordered  Monitor patient's weight and dietary intake as ordered or per policy  Utilize nutrition screening tool and intervene as necessary  Determine patient's food preferences and provide high-protein, high-caloric foods as appropriate       INTERVENTIONS:  - Monitor oral intake, urinary output, labs, and treatment plans  - Assess nutrition and hydration status and recommend course of action  - Evaluate amount of meals eaten  - Assist patient with eating if necessary   - Allow adequate time for meals  - Recommend/ encourage appropriate diets, oral nutritional supplements, and vitamin/mineral supplements  - Order, calculate, and assess calorie counts as needed  - Recommend, monitor, and adjust tube feedings and TPN/PPN based on assessed needs  - Assess need for intravenous fluids  - Provide specific nutrition/hydration education as appropriate  - Include patient/family/caregiver in decisions related to nutrition  Outcome: Progressing

## 2020-09-13 NOTE — PROGRESS NOTES
Progress Note - Colorectal surgery    Luther Shipley 79 y o  male MRN: [id number]  Unit/Bed#: [place] 801-01 Encounter: [id number]    Assessment:  79 y o  male 4 Days Post-Op s/p Procedure(s) (LRB):  COLECTOMY, SUBTOTAL LAPAROSCOPIC/HANDPORT ASSISTED (Left)    Remains hemodynamically stable  Doing well, tolerating CLD    PLAN:  -Consider advancing diet  - PT/OT  - incentive spirometry  Subjective:   Doing well, denies pain, nausea or vomiting, tolerating CLD, +ve flatus and BM      Objective:     Vitals: Temp:  [97 3 °F (36 3 °C)-98 4 °F (36 9 °C)] 98 4 °F (36 9 °C)  HR:  [88] 88  Resp:  [18] 18  BP: (118-150)/(78-98) 144/92  Body mass index is 24 39 kg/m²  I/O       09/10 0701 - 09/11 0700 09/11 0701 - 09/12 0700    P  O  0 0    I V  (mL/kg) 2758 2 (35 8) 1486 2 (19 3)    NG/GT 40 70    IV Piggyback 100     Total Intake(mL/kg) 2898 2 (37 6) 1556 2 (20 2)    Urine (mL/kg/hr) 550 (0 3) 1200 (0 6)    Emesis/NG output 1250 950    Stool 0     Total Output 1800 2150    Net +1098 2 -593 8          Unmeasured Urine Occurrence 1 x 1 x          Physical Exam:  Gen: NAD, Comfortable  Neuro: A&O, No focal deficits  Head: Normal Cephalic, Atraumatic  Eye: EOMI, PERRLA, No scleral icterus  Neck: Supple, No JVD, Midline trachea  CV: RRR, Cap refill <2 sec  Pulm: Normal work of breathing, no respiratory distress  Abd: Soft, Non-Distended, Non-Tender, incision cdi  Ext: No edema, Non-tender  Skin: warm, dry, intact    Lab, Imaging and other studies: I have personally reviewed pertinent reports      VTE Pharmacologic Prophylaxis: Sequential compression device (Venodyne)  and Heparin  VTE Mechanical Prophylaxis: sequential compression device

## 2020-09-13 NOTE — PROGRESS NOTES
Pt feeling nauseated; states "all of a sudden, I feel like junk   I started to feel slightly nauseated earlier and I held off on lunch, but now I feel bad"; bowel sounds hypoactive; abdomen slightly distended; dr Rachel Monroy aware; orders rec'd; prn zofran given

## 2020-09-14 VITALS
SYSTOLIC BLOOD PRESSURE: 135 MMHG | WEIGHT: 170 LBS | HEART RATE: 76 BPM | RESPIRATION RATE: 16 BRPM | TEMPERATURE: 97.8 F | HEIGHT: 70 IN | BODY MASS INDEX: 24.34 KG/M2 | OXYGEN SATURATION: 99 % | DIASTOLIC BLOOD PRESSURE: 89 MMHG

## 2020-09-14 LAB
ANION GAP SERPL CALCULATED.3IONS-SCNC: 8 MMOL/L (ref 4–13)
BASOPHILS # BLD AUTO: 0.04 THOUSANDS/ΜL (ref 0–0.1)
BASOPHILS NFR BLD AUTO: 1 % (ref 0–1)
BUN SERPL-MCNC: 10 MG/DL (ref 5–25)
CALCIUM SERPL-MCNC: 8.9 MG/DL (ref 8.3–10.1)
CHLORIDE SERPL-SCNC: 102 MMOL/L (ref 100–108)
CO2 SERPL-SCNC: 26 MMOL/L (ref 21–32)
CREAT SERPL-MCNC: 0.62 MG/DL (ref 0.6–1.3)
EOSINOPHIL # BLD AUTO: 0.3 THOUSAND/ΜL (ref 0–0.61)
EOSINOPHIL NFR BLD AUTO: 3 % (ref 0–6)
ERYTHROCYTE [DISTWIDTH] IN BLOOD BY AUTOMATED COUNT: 13 % (ref 11.6–15.1)
GFR SERPL CREATININE-BSD FRML MDRD: 93 ML/MIN/1.73SQ M
GLUCOSE SERPL-MCNC: 92 MG/DL (ref 65–140)
HCT VFR BLD AUTO: 35.2 % (ref 36.5–49.3)
HGB BLD-MCNC: 11.6 G/DL (ref 12–17)
IMM GRANULOCYTES # BLD AUTO: 0.04 THOUSAND/UL (ref 0–0.2)
IMM GRANULOCYTES NFR BLD AUTO: 1 % (ref 0–2)
LYMPHOCYTES # BLD AUTO: 0.54 THOUSANDS/ΜL (ref 0.6–4.47)
LYMPHOCYTES NFR BLD AUTO: 6 % (ref 14–44)
MAGNESIUM SERPL-MCNC: 2.2 MG/DL (ref 1.6–2.6)
MCH RBC QN AUTO: 28.4 PG (ref 26.8–34.3)
MCHC RBC AUTO-ENTMCNC: 33 G/DL (ref 31.4–37.4)
MCV RBC AUTO: 86 FL (ref 82–98)
MONOCYTES # BLD AUTO: 0.78 THOUSAND/ΜL (ref 0.17–1.22)
MONOCYTES NFR BLD AUTO: 9 % (ref 4–12)
NEUTROPHILS # BLD AUTO: 7 THOUSANDS/ΜL (ref 1.85–7.62)
NEUTS SEG NFR BLD AUTO: 80 % (ref 43–75)
NRBC BLD AUTO-RTO: 0 /100 WBCS
PHOSPHATE SERPL-MCNC: 3.4 MG/DL (ref 2.3–4.1)
PLATELET # BLD AUTO: 348 THOUSANDS/UL (ref 149–390)
PMV BLD AUTO: 10 FL (ref 8.9–12.7)
POTASSIUM SERPL-SCNC: 4.2 MMOL/L (ref 3.5–5.3)
RBC # BLD AUTO: 4.09 MILLION/UL (ref 3.88–5.62)
SODIUM SERPL-SCNC: 136 MMOL/L (ref 136–145)
WBC # BLD AUTO: 8.7 THOUSAND/UL (ref 4.31–10.16)

## 2020-09-14 PROCEDURE — 85025 COMPLETE CBC W/AUTO DIFF WBC: CPT | Performed by: SURGERY

## 2020-09-14 PROCEDURE — 97116 GAIT TRAINING THERAPY: CPT

## 2020-09-14 PROCEDURE — 83735 ASSAY OF MAGNESIUM: CPT | Performed by: SURGERY

## 2020-09-14 PROCEDURE — 80048 BASIC METABOLIC PNL TOTAL CA: CPT | Performed by: SURGERY

## 2020-09-14 PROCEDURE — 84100 ASSAY OF PHOSPHORUS: CPT | Performed by: SURGERY

## 2020-09-14 RX ORDER — OXYCODONE HYDROCHLORIDE 5 MG/1
5 TABLET ORAL EVERY 4 HOURS PRN
Qty: 8 TABLET | Refills: 0 | Status: SHIPPED | OUTPATIENT
Start: 2020-09-14 | End: 2020-09-16

## 2020-09-14 RX ORDER — TAMSULOSIN HYDROCHLORIDE 0.4 MG/1
0.4 CAPSULE ORAL
Qty: 30 CAPSULE | Refills: 0 | Status: SHIPPED | OUTPATIENT
Start: 2020-09-14 | End: 2020-10-02 | Stop reason: SDUPTHER

## 2020-09-14 RX ADMIN — HEPARIN SODIUM 5000 UNITS: 5000 INJECTION INTRAVENOUS; SUBCUTANEOUS at 06:20

## 2020-09-14 RX ADMIN — TIMOLOL MALEATE 1 DROP: 2.5 SOLUTION/ DROPS OPHTHALMIC at 10:22

## 2020-09-14 RX ADMIN — ONDANSETRON 4 MG: 2 INJECTION INTRAMUSCULAR; INTRAVENOUS at 02:00

## 2020-09-14 NOTE — PLAN OF CARE
Problem: GASTROINTESTINAL - ADULT  Goal: Minimal or absence of nausea and/or vomiting  Description: INTERVENTIONS:  - Administer IV fluids if ordered to ensure adequate hydration  - Maintain NPO status until nausea and vomiting are resolved  - Nasogastric tube if ordered  - Administer ordered antiemetic medications as needed  - Provide nonpharmacologic comfort measures as appropriate  - Advance diet as tolerated, if ordered  - Consider nutrition services referral to assist patient with adequate nutrition and appropriate food choices  Outcome: Progressing  Goal: Maintains or returns to baseline bowel function  Description: INTERVENTIONS:  - Assess bowel function  - Encourage oral fluids to ensure adequate hydration  - Administer IV fluids if ordered to ensure adequate hydration  - Administer ordered medications as needed  - Encourage mobilization and activity  - Consider nutritional services referral to assist patient with adequate nutrition and appropriate food choices  Outcome: Progressing  Goal: Maintains adequate nutritional intake  Description: INTERVENTIONS:  - Monitor percentage of each meal consumed  - Identify factors contributing to decreased intake, treat as appropriate  - Assist with meals as needed  - Monitor I&O, weight, and lab values if indicated  - Obtain nutrition services referral as needed  Outcome: Progressing     Problem: METABOLIC, FLUID AND ELECTROLYTES - ADULT  Goal: Electrolytes maintained within normal limits  Description: INTERVENTIONS:  - Monitor labs and assess patient for signs and symptoms of electrolyte imbalances  - Administer electrolyte replacement as ordered  - Monitor response to electrolyte replacements, including repeat lab results as appropriate  - Instruct patient on fluid and nutrition as appropriate  Outcome: Progressing     Problem: SKIN/TISSUE INTEGRITY - ADULT  Goal: Incision(s), wounds(s) or drain site(s) healing without S/S of infection  Description: INTERVENTIONS  - Assess and document risk factors for skin impairment   - Assess and document dressing, incision, wound bed, drain sites and surrounding tissue  - Consider nutrition services referral as needed  - Oral mucous membranes remain intact  - Provide patient/ family education  Outcome: Progressing     Problem: PAIN - ADULT  Goal: Verbalizes/displays adequate comfort level or baseline comfort level  Description: Interventions:  - Encourage patient to monitor pain and request assistance  - Assess pain using appropriate pain scale  - Administer analgesics based on type and severity of pain and evaluate response  - Implement non-pharmacological measures as appropriate and evaluate response  - Consider cultural and social influences on pain and pain management  - Notify physician/advanced practitioner if interventions unsuccessful or patient reports new pain  Outcome: Progressing     Problem: INFECTION - ADULT  Goal: Absence or prevention of progression during hospitalization  Description: INTERVENTIONS:  - Assess and monitor for signs and symptoms of infection  - Monitor lab/diagnostic results  - Monitor all insertion sites, i e  indwelling lines, tubes, and drains  - Monitor endotracheal if appropriate and nasal secretions for changes in amount and color  - [place] appropriate cooling/warming therapies per order  - Administer medications as ordered  - Instruct and encourage patient and family to use good hand hygiene technique  - Identify and instruct in appropriate isolation precautions for identified infection/condition  Outcome: Progressing     Problem: SAFETY ADULT  Goal: Patient will remain free of falls  Description: INTERVENTIONS:  - Assess patient frequently for physical needs  -  Identify cognitive and physical deficits and behaviors that affect risk of falls    -  [place] fall precautions as indicated by assessment   - Educate patient/family on patient safety including physical limitations  - Instruct patient to call for assistance with activity based on assessment  - Modify environment to reduce risk of injury  - Consider OT/PT consult to assist with strengthening/mobility  Outcome: Progressing  Goal: Maintain or return to baseline ADL function  Description: INTERVENTIONS:  -  Assess patient's ability to carry out ADLs; assess patient's baseline for ADL function and identify physical deficits which impact ability to perform ADLs (bathing, care of mouth/teeth, toileting, grooming, dressing, etc )  - Assess/evaluate cause of self-care deficits   - Assess range of motion  - Assess patient's mobility; develop plan if impaired  - Assess patient's need for assistive devices and provide as appropriate  - Encourage maximum independence but intervene and supervise when necessary  - Involve family in performance of ADLs  - Assess for home care needs following discharge   - Consider OT consult to assist with ADL evaluation and planning for discharge  - Provide patient education as appropriate  Outcome: Progressing  Goal: Maintain or return mobility status to optimal level  Description: INTERVENTIONS:  - Assess patient's baseline mobility status (ambulation, transfers, stairs, etc )    - Identify cognitive and physical deficits and behaviors that affect mobility  - Identify mobility aids required to assist with transfers and/or ambulation (gait belt, sit-to-stand, lift, walker, cane, etc )  - [place] fall precautions as indicated by assessment  - Record patient progress and toleration of activity level on Mobility SBAR; progress patient to next Phase/Stage  - Instruct patient to call for assistance with activity based on assessment  - Consider rehabilitation consult to assist with strengthening/weightbearing, etc   Outcome: Progressing     Problem: DISCHARGE PLANNING  Goal: Discharge to home or other facility with appropriate resources  Description: INTERVENTIONS:  - Identify barriers to discharge w/patient and caregiver  - Arrange for needed discharge resources and transportation as appropriate  - Identify discharge learning needs (meds, wound care, etc )  - Arrange for interpretive services to assist at discharge as needed  - Refer to Case Management Department for coordinating discharge planning if the patient needs post-hospital services based on physician/advanced practitioner order or complex needs related to functional status, cognitive ability, or social support system  Outcome: Progressing     Problem: Knowledge Deficit  Goal: Patient/family/caregiver demonstrates understanding of disease process, treatment plan, medications, and discharge instructions  Description: Complete learning assessment and assess knowledge base  Interventions:  - Provide teaching at level of understanding  - Provide teaching via preferred learning methods  Outcome: Progressing     Problem: Potential for Falls  Goal: Patient will remain free of falls  Description: INTERVENTIONS:  - Assess patient frequently for physical needs  -  Identify cognitive and physical deficits and behaviors that affect risk of falls    -  [place] fall precautions as indicated by assessment   - Educate patient/family on patient safety including physical limitations  - Instruct patient to call for assistance with activity based on assessment  - Modify environment to reduce risk of injury  - Consider OT/PT consult to assist with strengthening/mobility  Outcome: Progressing     Problem: Prexisting or High Potential for Compromised Skin Integrity  Goal: Skin integrity is maintained or improved  Description: INTERVENTIONS:  - Identify patients at risk for skin breakdown  - Assess and monitor skin integrity  - Assess and monitor nutrition and hydration status  - Monitor labs   - Assess for incontinence   - Turn and reposition patient  - Assist with mobility/ambulation  - Relieve pressure over bony prominences  - Avoid friction and shearing  - Provide appropriate hygiene as needed including keeping skin clean and dry  - Evaluate need for skin moisturizer/barrier cream  - Collaborate with interdisciplinary team   - Patient/family teaching  - Consider wound care consult   Outcome: Progressing     Problem: Nutrition/Hydration-ADULT  Goal: Nutrient/Hydration intake appropriate for improving, restoring or maintaining nutritional needs  Description: Monitor and assess patient's nutrition/hydration status for malnutrition  Collaborate with interdisciplinary team and initiate plan and interventions as ordered  Monitor patient's weight and dietary intake as ordered or per policy  Utilize nutrition screening tool and intervene as necessary  Determine patient's food preferences and provide high-protein, high-caloric foods as appropriate       INTERVENTIONS:  - Monitor oral intake, urinary output, labs, and treatment plans  - Assess nutrition and hydration status and recommend course of action  - Evaluate amount of meals eaten  - Assist patient with eating if necessary   - Allow adequate time for meals  - Recommend/ encourage appropriate diets, oral nutritional supplements, and vitamin/mineral supplements  - Order, calculate, and assess calorie counts as needed  - Recommend, monitor, and adjust tube feedings and TPN/PPN based on assessed needs  - Assess need for intravenous fluids  - Provide specific nutrition/hydration education as appropriate  - Include patient/family/caregiver in decisions related to nutrition  Outcome: Progressing

## 2020-09-14 NOTE — UTILIZATION REVIEW
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  Assessment/Plan: POD #4 s/p Procedure(s) (LRB):  COLECTOMY, SUBTOTAL LAPAROSCOPIC/HANDPORT ASSISTED (Left)  Consider advancing diet  Tolerating clear liquid diet  Incentive spirometry  Pain control  + flatus and BM       Pertinent Labs/Diagnostic Results:       Results from last 7 days   Lab Units 09/14/20  0441 09/13/20  0514 09/12/20  0428 09/11/20 0447 09/10/20  0444   WBC Thousand/uL 8 70 9 09 9 49 11 79* 14 45*   HEMOGLOBIN g/dL 11 6* 11 3* 10 6* 10 9* 11 8*   HEMATOCRIT % 35 2* 34 3* 33 4* 33 8* 36 6   PLATELETS Thousands/uL 348 321 280 307 358   NEUTROS ABS Thousands/µL 7 00  --  7 49 9 77* 12 73*         Results from last 7 days   Lab Units 09/14/20  0441 09/13/20  0514 09/12/20  0428 09/11/20 0447 09/10/20  0444 09/09/20  0438   SODIUM mmol/L 136 135* 137 138 139 139   POTASSIUM mmol/L 4 2 4 2 4 1 4 3 3 7 4 6   CHLORIDE mmol/L 102 103 103 105 103 106   CO2 mmol/L 26 26 29 29 30 25   ANION GAP mmol/L 8 6 5 4 6 8   BUN mg/dL 10 7 9 11 14 9   CREATININE mg/dL 0 62 0 60 0 62 0 80 0 84 0 84   EGFR ml/min/1 73sq m 93 94 93 84 82 82   CALCIUM mg/dL 8 9 8 8 8 4 8 3 8 9 8 5   MAGNESIUM mg/dL 2 2 2 3 2 0 2 4  --  2 2   PHOSPHORUS mg/dL 3 4 3 0 2 5 1 9*  --  3 7             Results from last 7 days   Lab Units 09/14/20  0441 09/13/20  0514 09/12/20  0428 09/11/20  0447 09/10/20  0444 09/09/20  0438 09/08/20  0457   GLUCOSE RANDOM mg/dL 92 121 117 137 158* 217* 108     Vital Signs:   09/13/20 16:24:29   97 4 °F (36 3 °C)Abnormal        18   153/100   118             09/13/20 0756            141/60                09/13/20 07:22:56   97 2 °F (36 2 °C)Abnormal        15   154/101  Abnormal     119             Medications:   Scheduled Medications:  heparin (porcine), 5,000 Units, Subcutaneous, Q8H [facility]  latanoprost, 1 drop, Both Eyes, HS  pravastatin, 20 mg, Oral, Daily With Dinner  tamsulosin, 0 4 mg, Oral, After Dinner  timolol, 1 drop, Both Eyes, Daily      Continuous IV Infusions:    dextrose 5 % and sodium chloride 0 45 % with KCl 20 mEq/L infusion    Rate: 75 mL/hr Dose: 75 mL/hr  Freq: Continuous Route: IV  Indications of Use: IV Hydration  Last Dose: Stopped (09/13/20 1000)  Start: 09/09/20 0530 End: 09/13/20 0724    PRN Meds:  aluminum-magnesium hydroxide-simethicone, 30 mL, Oral, Q4H PRN  HYDROmorphone, 0 5 mg, Intravenous, Q3H PRN  metoclopramide, 10 mg, Intravenous, Q6H PRN  ondansetron, 4 mg, Intravenous, Q4H PRN 9/13 x2  oxyCODONE, 10 mg, Oral, Q4H PRN  oxyCODONE, 5 mg, Oral, Q4H PRN  phenol, 1 spray, Mouth/Throat, Q2H PRN  saliva substitute, 5 spray, Mouth/Throat, 4x Daily PRN      Discharge Plan: D    Network Utilization Review Department  [email or web address]  ATTENTION: Please call with any questions or concerns to [phone] and carefully listen to the prompts so that you are directed to the right person  All voicemails are confidential   Yanick Robison all requests for admission clinical reviews, approved or denied determinations and any other requests to dedicated fax number below belonging to the campus where the patient is receiving treatment   List of dedicated fax numbers for the Facilities:  FACILITY NAME UR FAX NUMBER   ADMISSION DENIALS (Administrative/Medical Necessity) [phone]   [address] (Maternity/NICU/Pediatrics) [phone]   [facility] [phone]   [facility] [phone]   [address] [place], [place] [address]a [facility] [id number] [facility] [address] [facility] [phone]

## 2020-09-14 NOTE — QUICK NOTE
Re-assessed patient this afternoon after consuming breakfast and lunch  Patient tolerating diet without N/V  Energetic for discharge  Patient able to tolerate eating without difficulty and without N/V  Cleared for discharge from a surgical standpoint with follow up with Dr Layne Thapa as an outpatient  Discharge instructions discussed with the patient as well as post-op appts  Patient in agreement with plan      Aranza Escalona PA-C

## 2020-09-14 NOTE — PLAN OF CARE
Problem: PHYSICAL THERAPY ADULT  Goal: Performs mobility at highest level of function for planned discharge setting  See evaluation for individualized goals  Description: Treatment/Interventions: Functional transfer training, LE strengthening/ROM, Elevations, Endurance training, Therapeutic exercise, Patient/family training, Equipment eval/education, Bed mobility, Gait training          See flowsheet documentation for full assessment, interventions and recommendations  Outcome: Progressing  Note: Prognosis: Good  Problem List: Decreased strength, Decreased range of motion, Decreased endurance, Impaired balance, Decreased mobility  Assessment: Pt able to ambulate increased distances today, as well as performing stairs  Pt steady with RW during gait, but has a tendency to round shoulders and hunch forward, requiring VCs to correct  During sit to stands, gave patient VCs to engage LEs and limit reliance on UEs  With decreased assistance from UEs, pt required increased rest but was able to complete  Pt would benefit from continued acute skilled PT to continue improving LE strength, balance, and endurance to improve overall functional mobility  Pt's daughter present throughout session  Educated pt and daughter on using RW for first few days at home and to provide supervision when on steps  Pt and daughter both verbalized understanding  Pt denies any further concerns about returning home at this time  Recommend home with family support upon d/c  Pt returned to recliner with all needs within reach  Barriers to Discharge: None     PT Discharge Recommendation: Return to previous environment with social support(home with family care)     PT - OK to Discharge: Yes    See flowsheet documentation for full assessment

## 2020-09-14 NOTE — PROGRESS NOTES
Progress Note -    Linn Us 79 y o  male MRN: [id number]  Unit/Bed#: [facility] 801-01 Encounter: [id number]    Assessment:  79 y  o  male 6 Days Post-Op s/p Procedure(s) (LRB):  COLECTOMY, SUBTOTAL LAPAROSCOPIC/HANDPORT ASSISTED (Left)     Remains hemodynamically stable  Doing well, tolerating a regular diet  PLAN:   - disposition planning   - PT/OT/IS  Subjective:   - no new complaints  Objective:     Vitals: Temp:  [97 2 °F (36 2 °C)-99 1 °F (37 3 °C)] 99 1 °F (37 3 °C)  Resp:  [15-18] 18  BP: (118-154)/() 144/93  Body mass index is 24 39 kg/m²  I/O       09/12 0701 - 09/13 0700 09/13 0701 - 09/14 0700    P  O  495 240    I V  (mL/kg) 1731 4 (22 5) 536 3 (7)    NG/GT 30     Total Intake(mL/kg) 2256 4 (29 3) 776 3 (10 1)    Urine (mL/kg/hr) 2625 (1 4) 975 (0 5)    Emesis/NG output 300     Stool 0 0    Total Output 2925 975    Net -668 7 -198 8          Unmeasured Urine Occurrence  1 x    Unmeasured Stool Occurrence 3 x 1 x          Physical Exam:  GEN: NAD  HEENT: MMM  CV: RRR  Lung: Normal effort  Abdomen:  Soft, ND, appropriately tender to palpation; incisions clean,dry and intact  Extrem: No CCE  Neuro: A+Ox3    Lab, Imaging and other studies: I have personally reviewed pertinent reports      VTE Pharmacologic Prophylaxis: Heparin  VTE Mechanical Prophylaxis: sequential compression device

## 2020-09-14 NOTE — DISCHARGE SUMMARY
Discharge Summary - Colorectal Surgery   Luther Shipley 79 y o  male MRN: [id number]  Unit/Bed#: [place] 801-01 Encounter: [id number]        Admitting Diagnosis:  Cancer of the splenic flexure    Admit Date:  09/07/2020    Discharge Diagnosis:  Laparoscopic/hand port assisted left subtotal colectomy, liver core biopsy, partial omentectomy    Discharge Date:  09/14/2020    HPI:  50-year-old male who presented to the ED with pelvic pain and was initially diagnosed with the UTI, underwent CT scan with evidence of colon cancer with liver Mets  Patient underwent colonoscopy which showed a near obstructing mass of the splenic flexure  He was scheduled for a laparoscopic/hand assisted left subtotal colectomy  He presented today to undergo surgical intervention  Procedures Performed:    09/08/2020: Laparoscopic/hand port assisted left subtotal colectomy, liver core biopsy, partial omentectomy  Hospital Course:  As mentioned in HPI above, patient underwent surgical intervention without complications  On postoperative day 1 patient was doing well, pain was controlled without use of PCA pump, denied nausea or vomiting, [place] was draining ray urine, no flatus or bowel movements  On postoperative day 2 patient stated he was feeling much worse, not passing flatus, significant nausea with no relief with Zofran  He was taking in minimal clear liquids, and was voiding well for which the [place] catheter was removed  An NG-tube was placed for persistent nausea and emesis likely secondary to postoperative ileus  By postoperative day 3 patient was feeling much better with NG tube output approximately 1200ml in 24 hours, bilious  Leukocytosis was improving, remained mildly distended, and had 1 bowel movement  He improved over postoperative day 4  By postoperative day 5 patient was cleared for discharge from surgical standpoint    He felt much better, nausea and vomiting have resolved, was passing flatus and having bowel movements  Was out of bed and ambulating throughout hallways as well as using incentive spirometer  Discharge instructions as well as postoperative appointments were discussed with the patient  Patient is in agreement with plan  All questions were answered at bedside  Lab Results:   I have personally reviewed pertinent lab results  , CBC:   Lab Results   Component Value Date    WBC 8 70 09/14/2020    HGB 11 6 (L) 09/14/2020    HCT 35 2 (L) 09/14/2020    MCV 86 09/14/2020     09/14/2020    MCH 28 4 09/14/2020    MCHC 33 0 09/14/2020    RDW 13 0 09/14/2020    MPV 10 0 09/14/2020    NRBC 0 09/14/2020   , CMP:   Lab Results   Component Value Date    SODIUM 136 09/14/2020    K 4 2 09/14/2020     09/14/2020    CO2 26 09/14/2020    BUN 10 09/14/2020    CREATININE 0 62 09/14/2020    CALCIUM 8 9 09/14/2020    EGFR 93 09/14/2020   , Magnesium: No components found for: MAG, Phosphorous:   Lab Results   Component Value Date    PHOS 3 4 09/14/2020   , CEA: No results found for: CEA, CA 19-9: No results found for: , AFP: No results found for: AFP    Complications: Post-op ileus    Condition at Discharge: good     Discharge instructions/Information to patient and family:   See after visit summary for information provided to patient and family  Provisions for Follow-Up Care:  See after visit summary for information related to follow-up care and any pertinent home health orders  Disposition: Home    Planned Readmission: No    Discharge Statement   I spent 35 minutes discharging the patient  This time was spent on the day of discharge  I had direct contact with the patient on the day of discharge  Additional documentation is required if more than 30 minutes were spent on discharge  Discharge Medications:  See after visit summary for reconciled discharge medications provided to patient and family        Tere Smith PA-C

## 2020-09-14 NOTE — DISCHARGE INSTRUCTIONS
Call office if develop fevers, chills, nausea, vomiting, or increase in pain  Low residue/Low Fiber Diet until office follow-up    Low Fiber Diet   WHAT YOU NEED TO KNOW:   A low-fiber diet limits foods that are high in fiber  Fiber is the part of fruits, vegetables, and grains that is not broken down by your body  You may need to follow this diet after surgery on your intestines  You may also need to follow this diet for certain conditions such as Crohn disease or ulcerative colitis  Ask your healthcare provider or dietitian how much fiber you can have each day  DISCHARGE INSTRUCTIONS:   Foods to include:  Read food labels to check the amount of fiber that are found in foods  Some foods that are low in fiber include the following:  · Grains:  Choose grains that have less than 2 grams of fiber in each serving   Examples include the following:     ¨ Cream of wheat and finely ground grits    ¨ Dry cereal made from rice     ¨ White bread, white pasta, and white rice    ¨ Crackers, bagels, and rolls made from white or refined flour    · Other foods:      ¨ Canned and well-cooked fruit without skins or seeds, and juice without pulp    ¨ Ripe bananas and melons    ¨ Canned and well-cooked vegetables without skins or seeds, and vegetable juice    ¨ Cow's milk, lactose-free milk, soy milk, and rice milk    ¨ Yogurt without nuts, fruit, or granola    ¨ Eggs, poultry (such as chicken and turkey), fish, and tender, ground, well-cooked beef     ¨ Tofu and smooth peanut butter    ¨ Broth and strained soups made of low-fiber foods  Foods to avoid:   · Breads, cereals, crackers, and pasta made with whole wheat or whole grains (such as whole oats)    · [employer] [place], wild rice, quinoa, kasha, and barley    · All fresh fruit with skin, except banana and melons     · Dried fruits and fruit juice with pulp    · Canned pineapple    · Raw vegetables    · Nuts, seeds, and popcorn    · Beans, nuts, peas, and lentils    · Tough meats    · Coconut and avocado  What else you should know about a low-fiber diet:  A low-fiber diet can decrease the amount of bowel movements you have  Drink liquids as directed to avoid constipation  Ask how much liquid to drink each day and which liquids are best for you  © 2017 [employer] [employer] Information is for End User's use only and may not be sold, redistributed or otherwise used for commercial purposes  All illustrations and images included in CareNotes® are the copyrighted property of A D A M [employer] Inc  or Valente Montoya  The above information is an  only  It is not intended as medical advice for individual conditions or treatments  Talk to your doctor, nurse or pharmacist before following any medical regimen to see if it is safe and effective for you

## 2020-09-14 NOTE — PHYSICAL THERAPY NOTE
Physical Therapy Treatment Note    Patient's Name: Randa Vinson  : 20 1109   PT Last Visit   PT Visit Date 20   Pain Assessment   Pain Assessment Tool Pain Assessment not indicated - pt denies pain   Pain Score No Pain   Restrictions/Precautions   Weight Bearing Precautions Per Order No   Other Precautions Chair Alarm; Bed Alarm; Fall Risk   General   Chart Reviewed Yes   Family/Caregiver Present Yes  (daughter)   Cognition   Overall Cognitive Status WFL   Orientation Level Oriented X4   Following Commands Follows all commands and directions without difficulty   Comments Pt pleasant and cooperative; demonstrates good safety awareness during transfers and ambulation   Bed Mobility   Additional Comments Pt OOB upon arrival; bed mobility not assessed   Transfers   Sit to Stand 5  Supervision   Additional items Verbal cues; Assist x 1   Stand to Sit 5  Supervision   Additional items Verbal cues; Assist x 1   Additional Comments 2x5 STS with RW with VCs for hand placement as well as VCs for safe hand placement   Ambulation/Elevation   Gait pattern Narrow TARIK; Forward Flexion;Decreased foot clearance; Short stride   Gait Assistance 5  Supervision   Additional items Assist x 1   Assistive Device Rolling walker   Distance 180ft + 7 steps up/down with VCs for keeping RW in close proximity   Stair Management Assistance 5  Supervision   Additional items Assist x 1   Stair Management Technique One rail R;Alternating pattern; Step to pattern  (alternating pattern up; step to down)   Number of Stairs 7   Balance   Static Sitting Fair +   Dynamic Sitting Fair +   Static Standing Fair   Dynamic Standing Fair -   Ambulatory Fair -   Endurance Deficit   Endurance Deficit No   Activity Tolerance   Activity Tolerance Patient tolerated treatment well   Nurse Made Aware RN updated   Exercises   Marching Standing;20 reps;AROM; Bilateral   Assessment   Prognosis Good   Problem List Decreased strength;Decreased range of motion;Decreased endurance; Impaired balance;Decreased mobility   Assessment Pt able to ambulate increased distances today, as well as performing stairs  Pt steady with RW during gait, but has a tendency to round shoulders and hunch forward, requiring VCs to correct  During sit to stands, gave patient VCs to engage LEs and limit reliance on UEs  With decreased assistance from UEs, pt required increased rest but was able to complete  Pt would benefit from continued acute skilled PT to continue improving LE strength, balance, and endurance to improve overall functional mobility  Pt's daughter present throughout session  Educated pt and daughter on using RW for first few days at home and to provide supervision when on steps  Pt and daughter both verbalized understanding  Pt denies any further concerns about returning home at this time  Recommend home with family support upon d/c  Pt returned to recliner with all needs within reach  Barriers to Discharge None   Goals   Patient Goals to go home   STG Expiration Date 09/19/20   PT Treatment Day 1   Plan   Treatment/Interventions Functional transfer training;LE strengthening/ROM; Elevations; Therapeutic exercise; Endurance training;Patient/family training;Equipment eval/education;Gait training;Bed mobility   Progress Progressing toward goals   PT Frequency Other (Comment)  (3-5x/wk)   Recommendation   PT Discharge Recommendation Return to previous environment with social support  (home with family care)   Equipment Recommended [place]   PT - OK to Discharge Yes       Lorena Sidhu, PT, DPT

## 2020-09-17 ENCOUNTER — PATIENT OUTREACH (OUTPATIENT)
Dept: HEMATOLOGY ONCOLOGY | Facility: CLINIC | Age: 80
End: 2020-09-17

## 2020-09-17 ENCOUNTER — PREP FOR PROCEDURE (OUTPATIENT)
Dept: INTERVENTIONAL RADIOLOGY/VASCULAR | Facility: CLINIC | Age: 80
End: 2020-09-17

## 2020-09-17 DIAGNOSIS — C18.6 CANCER OF DESCENDING COLON (HCC): Primary | ICD-10-CM

## 2020-09-17 DIAGNOSIS — C78.7 METASTASES TO THE LIVER (HCC): ICD-10-CM

## 2020-09-17 NOTE — PROGRESS NOTES
Phone outreach to patient today  He tells me he is recovering well at home from surgery  Still having some discomfort and his appetite isn't great, but he's slowly improving  I reviewed with him that I made an appointment for him to meet with Dr Serge Greer on 10/1/20 at 11 AM at the [facility] office to talk about chemotherapy  I had previously given this appointment to his daughter when I spoke to her last week so he was aware of this  I also informed him that he will need a CT of his chest which I scheduled for 9/25/20 at 1:30 PM at the [address] [place] [address]  Additionally I told him there are orders for a port placement for chemotherapy  Reviewed what a port is and what it would be necessary  I told him the order is for after the 10/1 consult with Dr Serge Greer and if IR calls him to schedule this he should request an appointment after 10/1/20  He was appreciative of my call  Encouraged him to call me if he has any other questions or concerns

## 2020-09-22 RX ORDER — TIMOLOL MALEATE 5 MG/ML
SOLUTION/ DROPS OPHTHALMIC
COMMUNITY
Start: 2020-08-06

## 2020-09-25 ENCOUNTER — HOSPITAL ENCOUNTER (OUTPATIENT)
Dept: CT IMAGING | Facility: HOSPITAL | Age: 80
Discharge: HOME | End: 2020-09-25
Payer: COMMERCIAL

## 2020-09-25 DIAGNOSIS — C78.7 METASTASES TO THE LIVER (HCC): ICD-10-CM

## 2020-09-25 DIAGNOSIS — C18.6 CANCER OF DESCENDING COLON (HCC): ICD-10-CM

## 2020-09-25 PROCEDURE — 71260 CT THORAX DX C+: CPT

## 2020-09-25 PROCEDURE — G1004 CDSM NDSC: HCPCS

## 2020-09-25 RX ADMIN — IOHEXOL 85 ML: 350 INJECTION, SOLUTION INTRAVENOUS at 13:30

## 2020-09-28 ENCOUNTER — TELEPHONE (OUTPATIENT)
Dept: HEMATOLOGY ONCOLOGY | Facility: CLINIC | Age: 80
End: 2020-09-28

## 2020-10-01 ENCOUNTER — PATIENT OUTREACH (OUTPATIENT)
Dept: HEMATOLOGY ONCOLOGY | Facility: CLINIC | Age: 80
End: 2020-10-01

## 2020-10-01 ENCOUNTER — CONSULT (OUTPATIENT)
Dept: HEMATOLOGY ONCOLOGY | Facility: CLINIC | Age: 80
End: 2020-10-01
Payer: COMMERCIAL

## 2020-10-01 VITALS
BODY MASS INDEX: 24.26 KG/M2 | OXYGEN SATURATION: 98 % | SYSTOLIC BLOOD PRESSURE: 146 MMHG | TEMPERATURE: 97.8 F | DIASTOLIC BLOOD PRESSURE: 96 MMHG | WEIGHT: 169.5 LBS | HEART RATE: 81 BPM | RESPIRATION RATE: 16 BRPM | HEIGHT: 70 IN

## 2020-10-01 DIAGNOSIS — C78.7 METASTASES TO THE LIVER (HCC): Primary | ICD-10-CM

## 2020-10-01 PROCEDURE — 99205 OFFICE O/P NEW HI 60 MIN: CPT | Performed by: INTERNAL MEDICINE

## 2020-10-02 ENCOUNTER — OFFICE VISIT (OUTPATIENT)
Dept: UROLOGY | Facility: MEDICAL CENTER | Age: 80
End: 2020-10-02
Payer: COMMERCIAL

## 2020-10-02 VITALS
HEIGHT: 70 IN | DIASTOLIC BLOOD PRESSURE: 80 MMHG | TEMPERATURE: 97.5 F | WEIGHT: 169 LBS | HEART RATE: 98 BPM | BODY MASS INDEX: 24.2 KG/M2 | SYSTOLIC BLOOD PRESSURE: 120 MMHG

## 2020-10-02 DIAGNOSIS — N13.9 BENIGN LOCALIZED HYPERPLASIA OF PROSTATE WITH URINARY OBSTRUCTION AND LOWER URINARY TRACT SYMPTOMS: Primary | ICD-10-CM

## 2020-10-02 DIAGNOSIS — K63.89 COLONIC MASS: ICD-10-CM

## 2020-10-02 DIAGNOSIS — N40.1 BENIGN LOCALIZED HYPERPLASIA OF PROSTATE WITH URINARY OBSTRUCTION AND LOWER URINARY TRACT SYMPTOMS: Primary | ICD-10-CM

## 2020-10-02 LAB
SL AMB  POCT GLUCOSE, UA: ABNORMAL
SL AMB LEUKOCYTE ESTERASE,UA: ABNORMAL
SL AMB POCT BILIRUBIN,UA: ABNORMAL
SL AMB POCT BLOOD,UA: ABNORMAL
SL AMB POCT CLARITY,UA: CLEAR
SL AMB POCT COLOR,UA: YELLOW
SL AMB POCT KETONES,UA: ABNORMAL
SL AMB POCT NITRITE,UA: ABNORMAL
SL AMB POCT PH,UA: 6.5
SL AMB POCT SPECIFIC GRAVITY,UA: 1.02
SL AMB POCT URINE PROTEIN: ABNORMAL
SL AMB POCT UROBILINOGEN: ABNORMAL

## 2020-10-02 PROCEDURE — 81003 URINALYSIS AUTO W/O SCOPE: CPT | Performed by: NURSE PRACTITIONER

## 2020-10-02 PROCEDURE — 99214 OFFICE O/P EST MOD 30 MIN: CPT | Performed by: NURSE PRACTITIONER

## 2020-10-02 RX ORDER — TAMSULOSIN HYDROCHLORIDE 0.4 MG/1
0.4 CAPSULE ORAL
Qty: 90 CAPSULE | Refills: 3 | Status: SHIPPED | OUTPATIENT
Start: 2020-10-02

## 2020-10-05 PROBLEM — C78.5: Status: ACTIVE | Noted: 2020-10-05

## 2020-10-05 PROBLEM — C80.1: Status: ACTIVE | Noted: 2020-10-05

## (undated) DEVICE — SCD SEQUENTIAL COMPRESSION COMFORT SLEEVE MEDIUM KNEE LENGTH: Brand: KENDALL SCD

## (undated) DEVICE — PACK PBDS STERILE LAP LITHOTOMY RF

## (undated) DEVICE — VISUALIZATION SYSTEM: Brand: CLEARIFY

## (undated) DEVICE — PAD GROUNDING ADULT

## (undated) DEVICE — GLOVE SRG BIOGEL 8

## (undated) DEVICE — BAG URINE DRAINAGE 2000ML ANTI RFLX LF

## (undated) DEVICE — GLOVE INDICATOR PI UNDERGLOVE SZ 8 BLUE

## (undated) DEVICE — PROXIMATE RELOADABLE LINEAR CUTTER WITH SAFETY LOCK-OUT, 75MM: Brand: PROXIMATE

## (undated) DEVICE — SYRINGE 20ML LL

## (undated) DEVICE — ANTI-FOG SOLUTION WITH FOAM PAD: Brand: DEVON

## (undated) DEVICE — SUT MONOCRYL 4-0 PS-2 18 IN Y496G

## (undated) DEVICE — INSUFLATION TUBING INSUFLOW (LEXION)

## (undated) DEVICE — UNDER BUTTOCKS DRAPE W/FLUID CONTROL POUCH: Brand: CONVERTORS

## (undated) DEVICE — SUT PROLENE 2-0 KS 30 IN 8623H

## (undated) DEVICE — SUT PDS II 1 CTX 36 IN Z371T

## (undated) DEVICE — GLOVE SRG BIOGEL 7.5

## (undated) DEVICE — TROCAR: Brand: KII FIOS FIRST ENTRY

## (undated) DEVICE — CATH FOLEY 20FR 30ML 2 WAY SILICONE-ELASTIMER

## (undated) DEVICE — TRU-CUT NEEDLE BIOPSY SOFT TISSUE 14G X 15CM: Brand: TRU-CUT

## (undated) DEVICE — SUT VICRYL 0 CT-1 27 IN J260H

## (undated) DEVICE — INVIEW CLEAR LEGGINGS: Brand: CONVERTORS

## (undated) DEVICE — PLUMEPEN PRO 10FT

## (undated) DEVICE — POOLE SUCTION HANDLE: Brand: CARDINAL HEALTH

## (undated) DEVICE — 3000CC GUARDIAN II: Brand: GUARDIAN

## (undated) DEVICE — CHLORAPREP HI-LITE 26ML ORANGE

## (undated) DEVICE — SUT VICRYL 0 UR-6 27 IN J603H

## (undated) DEVICE — TRAY FOLEY 16FR URIMETER SILICONE SURESTEP

## (undated) DEVICE — TUBING SUCTION 5MM X 12 FT

## (undated) DEVICE — TRAVELKIT CONTAINS FIRST STEP KIT (200ML EP-4 KIT) AND SOILED SCOPE BAG - 1 KIT: Brand: TRAVELKIT CONTAINS FIRST STEP KIT AND SOILED SCOPE BAG

## (undated) DEVICE — TROCAR: Brand: KII SLEEVE

## (undated) DEVICE — SUT SILK 2-0 TIES 144 IN LA55G

## (undated) DEVICE — PACK TUR

## (undated) DEVICE — PROXIMATE LINEAR CUTTER RELOAD, BLUE, 75MM: Brand: PROXIMATE

## (undated) DEVICE — ENSEAL LAPAROSCOPIC TISSUE SEALER G2 CURVED JAW FOR USE WITH G2 GENERATOR 5MM DIAMETER 35CM SHAFT LENGTH: Brand: ENSEAL

## (undated) DEVICE — UROCATCH BAG

## (undated) DEVICE — INTENDED FOR TISSUE SEPARATION, AND OTHER PROCEDURES THAT REQUIRE A SHARP SURGICAL BLADE TO PUNCTURE OR CUT.: Brand: BARD-PARKER SAFETY BLADES SIZE 11, STERILE

## (undated) DEVICE — 3M™ IOBAN™ 2 ANTIMICROBIAL INCISE DRAPE 6650EZ: Brand: IOBAN™ 2

## (undated) DEVICE — INTENDED FOR TISSUE SEPARATION, AND OTHER PROCEDURES THAT REQUIRE A SHARP SURGICAL BLADE TO PUNCTURE OR CUT.: Brand: BARD-PARKER SAFETY BLADES SIZE 15, STERILE

## (undated) DEVICE — SUT VICRYL 0 REEL 54 IN J287G

## (undated) DEVICE — ADHESIVE SKIN HIGH VISCOSITY EXOFIN 1ML

## (undated) DEVICE — IRRIG ENDO FLO TUBING

## (undated) DEVICE — CO2 AND WATER TUBING/CAP SET FOR OLYMPUS® SCOPES & UCR: Brand: ERBE

## (undated) DEVICE — Device: Brand: DEFENDO AIR/WATER/SUCTION AND BIOPSY VALVE

## (undated) DEVICE — STRL COTTON TIP APPLCTR 6IN PK: Brand: CARDINAL HEALTH

## (undated) DEVICE — CATH SECURE FOLEY